# Patient Record
Sex: MALE | Race: BLACK OR AFRICAN AMERICAN | NOT HISPANIC OR LATINO | Employment: FULL TIME | URBAN - METROPOLITAN AREA
[De-identification: names, ages, dates, MRNs, and addresses within clinical notes are randomized per-mention and may not be internally consistent; named-entity substitution may affect disease eponyms.]

---

## 2018-12-18 ENCOUNTER — OFFICE VISIT (OUTPATIENT)
Dept: INTERNAL MEDICINE | Facility: CLINIC | Age: 43
End: 2018-12-18
Payer: COMMERCIAL

## 2018-12-18 VITALS
DIASTOLIC BLOOD PRESSURE: 76 MMHG | WEIGHT: 315 LBS | HEART RATE: 74 BPM | SYSTOLIC BLOOD PRESSURE: 130 MMHG | HEIGHT: 73 IN | BODY MASS INDEX: 41.75 KG/M2

## 2018-12-18 DIAGNOSIS — Z00.00 ROUTINE GENERAL MEDICAL EXAMINATION AT A HEALTH CARE FACILITY: Primary | ICD-10-CM

## 2018-12-18 DIAGNOSIS — N52.9 IMPOTENCE: ICD-10-CM

## 2018-12-18 DIAGNOSIS — E66.01 MORBID OBESITY: ICD-10-CM

## 2018-12-18 PROCEDURE — 99386 PREV VISIT NEW AGE 40-64: CPT | Mod: S$GLB,,, | Performed by: INTERNAL MEDICINE

## 2018-12-18 PROCEDURE — 99999 PR PBB SHADOW E&M-NEW PATIENT-LVL III: CPT | Mod: PBBFAC,,, | Performed by: INTERNAL MEDICINE

## 2018-12-18 RX ORDER — SILDENAFIL 100 MG/1
100 TABLET, FILM COATED ORAL DAILY PRN
Qty: 12 TABLET | Refills: 6 | Status: SHIPPED | OUTPATIENT
Start: 2018-12-18 | End: 2020-09-29 | Stop reason: SDUPTHER

## 2018-12-18 NOTE — PROGRESS NOTES
Subjective:       Patient ID: Roshan Zaidi is a 43 y.o. male.    Chief Complaint: Annual Exam    HPI  Pt establishing care.  Interested in a gastric sleeve due to his inability to lose weight.  Also c/o impotence.  Works as a .  Review of Systems   All other systems reviewed and are negative.      Objective:      Physical Exam   Constitutional: He appears well-developed. No distress.   obese   HENT:   Head: Normocephalic.   Eyes: EOM are normal. No scleral icterus.   Neck: Normal range of motion. No tracheal deviation present.   Cardiovascular: Normal rate, regular rhythm, normal heart sounds and intact distal pulses.   Pulmonary/Chest: Effort normal and breath sounds normal. No respiratory distress.   Abdominal: Soft. Bowel sounds are normal. He exhibits no distension. There is no tenderness.   Musculoskeletal: Normal range of motion. He exhibits no edema.   Neurological: He is alert.   Skin: Skin is warm and dry. No rash noted. He is not diaphoretic. No erythema.   Psychiatric: He has a normal mood and affect. His behavior is normal.   Vitals reviewed.      Assessment:       1. Routine general medical examination at a health care facility    2. Morbid obesity    3. Impotence        Plan:       Rosahn was seen today for annual exam.    Diagnoses and all orders for this visit:    Routine general medical examination at a health care facility  -     CBC auto differential; Future  -     Comprehensive metabolic panel; Future  -     Hemoglobin A1c; Future  -     Lipid panel; Future    Morbid obesity  -     Ambulatory Referral to Bariatric Medicine    Impotence  -     Testosterone Panel; Future  -     sildenafil (VIAGRA) 100 MG tablet; Take 1 tablet (100 mg total) by mouth daily as needed for Erectile Dysfunction.      Follow-up if symptoms worsen or fail to improve.

## 2018-12-19 ENCOUNTER — LAB VISIT (OUTPATIENT)
Dept: LAB | Facility: HOSPITAL | Age: 43
End: 2018-12-19
Attending: INTERNAL MEDICINE
Payer: COMMERCIAL

## 2018-12-19 DIAGNOSIS — Z00.00 ROUTINE GENERAL MEDICAL EXAMINATION AT A HEALTH CARE FACILITY: ICD-10-CM

## 2018-12-19 DIAGNOSIS — N52.9 IMPOTENCE: ICD-10-CM

## 2018-12-19 LAB
ALBUMIN SERPL BCP-MCNC: 4.4 G/DL
ALP SERPL-CCNC: 50 U/L
ALT SERPL W/O P-5'-P-CCNC: 50 U/L
ANION GAP SERPL CALC-SCNC: 9 MMOL/L
AST SERPL-CCNC: 39 U/L
BASOPHILS # BLD AUTO: 0.02 K/UL
BASOPHILS NFR BLD: 0.4 %
BILIRUB SERPL-MCNC: 0.6 MG/DL
BUN SERPL-MCNC: 12 MG/DL
CALCIUM SERPL-MCNC: 9.4 MG/DL
CHLORIDE SERPL-SCNC: 101 MMOL/L
CHOLEST SERPL-MCNC: 193 MG/DL
CHOLEST/HDLC SERPL: 5.2 {RATIO}
CO2 SERPL-SCNC: 29 MMOL/L
CREAT SERPL-MCNC: 0.83 MG/DL
DIFFERENTIAL METHOD: ABNORMAL
EOSINOPHIL # BLD AUTO: 0.2 K/UL
EOSINOPHIL NFR BLD: 2.7 %
ERYTHROCYTE [DISTWIDTH] IN BLOOD BY AUTOMATED COUNT: 13.4 %
EST. GFR  (AFRICAN AMERICAN): >60 ML/MIN/1.73 M^2
EST. GFR  (NON AFRICAN AMERICAN): >60 ML/MIN/1.73 M^2
ESTIMATED AVG GLUCOSE: 114 MG/DL
GLUCOSE SERPL-MCNC: 92 MG/DL
HBA1C MFR BLD HPLC: 5.6 %
HCT VFR BLD AUTO: 42.6 %
HDLC SERPL-MCNC: 37 MG/DL
HDLC SERPL: 19.2 %
HGB BLD-MCNC: 14 G/DL
LDLC SERPL CALC-MCNC: 143.6 MG/DL
LYMPHOCYTES # BLD AUTO: 1.8 K/UL
LYMPHOCYTES NFR BLD: 32.4 %
MCH RBC QN AUTO: 28.5 PG
MCHC RBC AUTO-ENTMCNC: 32.9 G/DL
MCV RBC AUTO: 87 FL
MONOCYTES # BLD AUTO: 0.5 K/UL
MONOCYTES NFR BLD: 9.6 %
NEUTROPHILS # BLD AUTO: 3.1 K/UL
NEUTROPHILS NFR BLD: 54.4 %
NONHDLC SERPL-MCNC: 156 MG/DL
PLATELET # BLD AUTO: 279 K/UL
PMV BLD AUTO: 8.9 FL
POTASSIUM SERPL-SCNC: 4.2 MMOL/L
PROT SERPL-MCNC: 8.7 G/DL
RBC # BLD AUTO: 4.92 M/UL
SODIUM SERPL-SCNC: 139 MMOL/L
TRIGL SERPL-MCNC: 62 MG/DL
WBC # BLD AUTO: 5.62 K/UL

## 2018-12-19 PROCEDURE — 36415 COLL VENOUS BLD VENIPUNCTURE: CPT | Mod: PO

## 2018-12-19 PROCEDURE — 80061 LIPID PANEL: CPT

## 2018-12-19 PROCEDURE — 80053 COMPREHEN METABOLIC PANEL: CPT | Mod: PO

## 2018-12-19 PROCEDURE — 83036 HEMOGLOBIN GLYCOSYLATED A1C: CPT

## 2018-12-19 PROCEDURE — 82040 ASSAY OF SERUM ALBUMIN: CPT | Mod: PO

## 2018-12-19 PROCEDURE — 85025 COMPLETE CBC W/AUTO DIFF WBC: CPT | Mod: PO

## 2018-12-19 PROCEDURE — 84270 ASSAY OF SEX HORMONE GLOBUL: CPT | Mod: PO

## 2018-12-24 LAB
ALBUMIN SERPL-MCNC: 4.3 G/DL (ref 3.6–5.1)
SHBG SERPL-SCNC: 44 NMOL/L (ref 10–50)
TESTOST FREE SERPL-MCNC: 38.7 PG/ML (ref 46–224)
TESTOST SERPL-MCNC: 377 NG/DL (ref 250–1100)
TESTOSTERONE.FREE+WB SERPL-MCNC: 76.3 NG/DL (ref 110–575)

## 2019-01-11 ENCOUNTER — TELEPHONE (OUTPATIENT)
Dept: INTERNAL MEDICINE | Facility: CLINIC | Age: 44
End: 2019-01-11

## 2019-01-11 NOTE — TELEPHONE ENCOUNTER
----- Message from Jayme Tsang sent at 1/11/2019 12:06 PM CST -----  Contact: self/[phone]  Patient called to speak with the nurse about his blood work results.  He is still waiting on a call back regarding this.    Please call and advise.

## 2019-11-15 ENCOUNTER — OFFICE VISIT (OUTPATIENT)
Dept: INTERNAL MEDICINE | Facility: CLINIC | Age: 44
End: 2019-11-15
Payer: COMMERCIAL

## 2019-11-15 ENCOUNTER — LAB VISIT (OUTPATIENT)
Dept: LAB | Facility: HOSPITAL | Age: 44
End: 2019-11-15
Attending: INTERNAL MEDICINE
Payer: COMMERCIAL

## 2019-11-15 VITALS
HEIGHT: 72 IN | OXYGEN SATURATION: 96 % | WEIGHT: 315 LBS | DIASTOLIC BLOOD PRESSURE: 74 MMHG | BODY MASS INDEX: 42.66 KG/M2 | SYSTOLIC BLOOD PRESSURE: 120 MMHG | HEART RATE: 79 BPM

## 2019-11-15 DIAGNOSIS — Z00.00 ROUTINE GENERAL MEDICAL EXAMINATION AT A HEALTH CARE FACILITY: Primary | ICD-10-CM

## 2019-11-15 DIAGNOSIS — E66.01 MORBID OBESITY: ICD-10-CM

## 2019-11-15 PROCEDURE — 83036 HEMOGLOBIN GLYCOSYLATED A1C: CPT

## 2019-11-15 PROCEDURE — 99396 PR PREVENTIVE VISIT,EST,40-64: ICD-10-PCS | Mod: 25,S$GLB,, | Performed by: INTERNAL MEDICINE

## 2019-11-15 PROCEDURE — 36415 COLL VENOUS BLD VENIPUNCTURE: CPT | Mod: PO

## 2019-11-15 PROCEDURE — 90471 FLU VACCINE (QUAD) GREATER THAN OR EQUAL TO 3YO PRESERVATIVE FREE IM: ICD-10-PCS | Mod: S$GLB,,, | Performed by: INTERNAL MEDICINE

## 2019-11-15 PROCEDURE — 99999 PR PBB SHADOW E&M-EST. PATIENT-LVL III: CPT | Mod: PBBFAC,,, | Performed by: INTERNAL MEDICINE

## 2019-11-15 PROCEDURE — 90472 IMMUNIZATION ADMIN EACH ADD: CPT | Mod: S$GLB,,, | Performed by: INTERNAL MEDICINE

## 2019-11-15 PROCEDURE — 90686 IIV4 VACC NO PRSV 0.5 ML IM: CPT | Mod: S$GLB,,, | Performed by: INTERNAL MEDICINE

## 2019-11-15 PROCEDURE — 99396 PREV VISIT EST AGE 40-64: CPT | Mod: 25,S$GLB,, | Performed by: INTERNAL MEDICINE

## 2019-11-15 PROCEDURE — 90471 IMMUNIZATION ADMIN: CPT | Mod: S$GLB,,, | Performed by: INTERNAL MEDICINE

## 2019-11-15 PROCEDURE — 90472 TDAP VACCINE GREATER THAN OR EQUAL TO 7YO IM: ICD-10-PCS | Mod: S$GLB,,, | Performed by: INTERNAL MEDICINE

## 2019-11-15 PROCEDURE — 99999 PR PBB SHADOW E&M-EST. PATIENT-LVL III: ICD-10-PCS | Mod: PBBFAC,,, | Performed by: INTERNAL MEDICINE

## 2019-11-15 PROCEDURE — 90715 TDAP VACCINE 7 YRS/> IM: CPT | Mod: S$GLB,,, | Performed by: INTERNAL MEDICINE

## 2019-11-15 PROCEDURE — 90686 FLU VACCINE (QUAD) GREATER THAN OR EQUAL TO 3YO PRESERVATIVE FREE IM: ICD-10-PCS | Mod: S$GLB,,, | Performed by: INTERNAL MEDICINE

## 2019-11-15 PROCEDURE — 90715 TDAP VACCINE GREATER THAN OR EQUAL TO 7YO IM: ICD-10-PCS | Mod: S$GLB,,, | Performed by: INTERNAL MEDICINE

## 2019-11-15 NOTE — PROGRESS NOTES
Subjective:       Patient ID: Roshan Zaidi is a 44 y.o. male.    Chief Complaint: Annual Exam    HPI  Wellness check.  Chart reviewed.  C/O inability to lose weight despite dieting.  Just passed a sleep study.  Denies hypersomnolence.  No CP, has mild TEJEDA.  Has some mild hip and knee pain.  Review of Systems   All other systems reviewed and are negative.      Objective:      Physical Exam   Constitutional: He appears well-developed. No distress.   obese   HENT:   Head: Normocephalic.   Eyes: EOM are normal. No scleral icterus.   Neck: Normal range of motion. No tracheal deviation present.   Cardiovascular: Normal rate, regular rhythm, normal heart sounds and intact distal pulses.   Pulmonary/Chest: Effort normal and breath sounds normal. No respiratory distress.   Abdominal: Soft. Bowel sounds are normal. He exhibits no distension. There is no tenderness.   Musculoskeletal: Normal range of motion. He exhibits no edema.   Neurological: He is alert.   Skin: Skin is warm and dry. No rash noted. He is not diaphoretic. No erythema.   Psychiatric: He has a normal mood and affect. His behavior is normal.   Vitals reviewed.      Assessment:       1. Routine general medical examination at a health care facility    2. Morbid obesity        Plan:       Roshan was seen today for annual exam.    Diagnoses and all orders for this visit:    Routine general medical examination at a health care facility  -     Influenza - Quadrivalent (PF)  -     Tdap Vaccine    Morbid obesity  -     Hemoglobin A1c; Future  -     Ambulatory Referral to Bariatric Medicine      Follow up if symptoms worsen or fail to improve.

## 2019-11-16 LAB
ESTIMATED AVG GLUCOSE: 114 MG/DL (ref 68–131)
HBA1C MFR BLD HPLC: 5.6 % (ref 4–5.6)

## 2019-11-18 ENCOUNTER — TELEPHONE (OUTPATIENT)
Dept: BARIATRICS | Facility: CLINIC | Age: 44
End: 2019-11-18

## 2019-11-18 ENCOUNTER — TELEPHONE (OUTPATIENT)
Dept: INTERNAL MEDICINE | Facility: CLINIC | Age: 44
End: 2019-11-18

## 2019-11-18 NOTE — TELEPHONE ENCOUNTER
----- Message from Bella Fitzgerald sent at 11/18/2019 10:53 AM CST -----  Contact: pt   Please call pt at [phone]    Patient has questions about why the bariatric surgery is not covered by [employer]?    Thank you

## 2019-11-18 NOTE — TELEPHONE ENCOUNTER
Called pt in regards to his message. Pt will call back in January after switching his insurance and making sure they cover bariatric surgery.

## 2020-02-07 ENCOUNTER — OCCUPATIONAL HEALTH (OUTPATIENT)
Dept: URGENT CARE | Facility: CLINIC | Age: 45
End: 2020-02-07

## 2020-02-07 DIAGNOSIS — Z02.89 ENCOUNTER FOR EXAMINATION REQUIRED BY DEPARTMENT OF TRANSPORTATION (DOT): ICD-10-CM

## 2020-02-07 DIAGNOSIS — Z02.1 PRE-EMPLOYMENT EXAMINATION: Primary | ICD-10-CM

## 2020-02-07 PROCEDURE — 99080 OSHA QUESTIONNAIRE: ICD-10-PCS | Mod: S$GLB,,, | Performed by: PHYSICIAN ASSISTANT

## 2020-02-07 PROCEDURE — 99499 UNLISTED E&M SERVICE: CPT | Mod: S$GLB,,, | Performed by: PHYSICIAN ASSISTANT

## 2020-02-07 PROCEDURE — 99080 SPECIAL REPORTS OR FORMS: CPT | Mod: S$GLB,,, | Performed by: PHYSICIAN ASSISTANT

## 2020-02-07 PROCEDURE — 94010 PULMONARY FUNCTION SCREENING (OCC MED PHYSICALS): ICD-10-PCS | Mod: S$GLB,,, | Performed by: PHYSICIAN ASSISTANT

## 2020-02-07 PROCEDURE — 99002 MASK FIT-QUALITATIVE: ICD-10-PCS | Mod: S$GLB,,, | Performed by: PHYSICIAN ASSISTANT

## 2020-02-07 PROCEDURE — 99002 DEVICE HANDLING PHYS/QHP: CPT | Mod: S$GLB,,, | Performed by: PHYSICIAN ASSISTANT

## 2020-02-07 PROCEDURE — 99499 PHYSICAL, RECERT DOT/CDL: ICD-10-PCS | Mod: S$GLB,,, | Performed by: PHYSICIAN ASSISTANT

## 2020-02-07 PROCEDURE — 94010 BREATHING CAPACITY TEST: CPT | Mod: S$GLB,,, | Performed by: PHYSICIAN ASSISTANT

## 2020-07-22 ENCOUNTER — TELEPHONE (OUTPATIENT)
Dept: BARIATRICS | Facility: CLINIC | Age: 45
End: 2020-07-22

## 2020-07-22 NOTE — TELEPHONE ENCOUNTER
----- Message from Yolanda Reyes sent at 7/22/2020  2:01 PM CDT -----  Contact: Patient Called [phone]  Wanting to see about Speak with .  Interested in Bariatric Sleeve.  Has a couple of questions in regards to payment.

## 2020-07-23 NOTE — TELEPHONE ENCOUNTER
Phoned patient and scheduled consult appointment. Patient with further finance questions. Gave patient 's number and advised to call tomorrow. Patient verbalized understanding.

## 2020-07-29 ENCOUNTER — TELEPHONE (OUTPATIENT)
Dept: BARIATRICS | Facility: CLINIC | Age: 45
End: 2020-07-29

## 2020-07-29 NOTE — TELEPHONE ENCOUNTER
Sent pt the link to do his [facility] pt portal. Patient stated he will have it done before his appt.

## 2020-07-30 ENCOUNTER — CLINICAL SUPPORT (OUTPATIENT)
Dept: BARIATRICS | Facility: CLINIC | Age: 45
End: 2020-07-30

## 2020-07-30 ENCOUNTER — OFFICE VISIT (OUTPATIENT)
Dept: BARIATRICS | Facility: CLINIC | Age: 45
End: 2020-07-30
Payer: COMMERCIAL

## 2020-07-30 ENCOUNTER — HOSPITAL ENCOUNTER (OUTPATIENT)
Dept: RADIOLOGY | Facility: HOSPITAL | Age: 45
Discharge: HOME | End: 2020-07-30
Attending: PHYSICIAN ASSISTANT
Payer: COMMERCIAL

## 2020-07-30 VITALS — BODY MASS INDEX: 42.66 KG/M2 | WEIGHT: 315 LBS | HEIGHT: 72 IN

## 2020-07-30 DIAGNOSIS — E66.01 MORBID OBESITY WITH BMI OF 60.0-69.9, ADULT: ICD-10-CM

## 2020-07-30 DIAGNOSIS — E66.01 MORBID OBESITY WITH BMI OF 60.0-69.9, ADULT: Primary | ICD-10-CM

## 2020-07-30 PROCEDURE — 71046 X-RAY EXAM CHEST 2 VIEWS: CPT | Mod: TC,FY,PO

## 2020-07-30 PROCEDURE — 99205 PR OFFICE/OUTPT VISIT, NEW, LEVL V, 60-74 MIN: ICD-10-PCS | Mod: 95,,, | Performed by: PHYSICIAN ASSISTANT

## 2020-07-30 PROCEDURE — 99499 UNLISTED E&M SERVICE: CPT | Mod: 95,,, | Performed by: DIETITIAN, REGISTERED

## 2020-07-30 PROCEDURE — 99205 OFFICE O/P NEW HI 60 MIN: CPT | Mod: 95,,, | Performed by: PHYSICIAN ASSISTANT

## 2020-07-30 PROCEDURE — 99499 NO LOS: ICD-10-PCS | Mod: 95,,, | Performed by: DIETITIAN, REGISTERED

## 2020-07-30 NOTE — LETTER
July 30, 2020      Jose Holm MD  [address] [facility] [address]  [address]  [place] [address]           [facility] - Bariatric Surgery  [address]  [place] [address]  Phone: [phone]  Fax: [phone]          Patient: Roshan Zaidi   MR Number: [id number]   YOB: 1975   Date of Visit: 7/30/2020       Dear Dr. Jose Holm:    Thank you for referring Roshan Zaidi to me for evaluation. Attached you will find relevant portions of my assessment and plan of care.    If you have questions, please do not hesitate to call me. I look forward to following Roshan Zaidi along with you.    Sincerely,    Nico Tobar PA-C    Enclosure  CC:  No Recipients    If you would like to receive this communication electronically, please contact [email or web address] or [phone] to request more information on [employer] Link access.    For providers and/or their staff who would like to refer a patient to [facility], please contact us through our one-stop-shop provider referral line, [facility], at [phone].    If you feel you have received this communication in error or would no longer like to receive these types of communications, please e-mail [email or web address]

## 2020-07-30 NOTE — PROGRESS NOTES
The patient location is: in a parked car ([place])  The chief complaint leading to consultation is: morbid obesity    Visit type: audiovisual    Face to Face time with patient: 60 min  60 minutes of total time spent on the encounter, which includes face to face time and non-face to face time preparing to see the patient (eg, review of tests), Obtaining and/or reviewing separately obtained history, Documenting clinical information in the electronic or other health record, Independently interpreting results (not separately reported) and communicating results to the patient/family/caregiver, or Care coordination (not separately reported).         Each patient to whom he or she provides medical services by telemedicine is:  (1) informed of the relationship between the physician and patient and the respective role of any other health care provider with respect to management of the patient; and (2) notified that he or she may decline to receive medical services by telemedicine and may withdraw from such care at any time.    NUTRITIONAL CONSULT    Referring Physician: Papa Arrington M.D.  Reason for MNT Referral: Initial assessment for sleeve gastrectomy work-up    PAST MEDICAL HISTORY:   45 y.o. male  Weight history includes weight has always been an issue but it was easier to maintain. 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 Presently at highest weight.     One of his greatest struggles has been his work schdule, driving trucks. He was eating late at night d/t getting off work late at night. Changed schedules with his job to an earlier shift, but he is driving out of town more, which means he was relying on fast food or take out more. Has a fridge in his truck. Started packing and bringing his own food. States he is ready to make a permanent lifestyle change for his health.    CLINICAL  DATA:  45 y.o.-year-old Black or  male.  Height: 6 ft.  Weight: 407 lbs (self-reported from 's visit in March)  IBW: 170 lbs  BMI: 55.2  The patient's goal weight (50% EBW): 289 lbs (BMI 39)  Personal goal weight: 225-250 lbs    Goal for Bariatric Surgery: to improve health, to improve quality of life, to lose weight and to prevent future medical conditions    NUTRITIONAL NEEDS: Pre op for bariatric surgery  1508-2032 Calories    Grams Protein    NUTRITION & HEALTH HISTORY:  Greatest challenge: dining out frequency, sweets, starchy CHO, portion control, snacking at night, irregular meal patterns and high-fat diet    Current diet recall:     4am wake up  6am: homemade smoothie: strawberries, banana, almond milk, protein powder  11am: turkey sandwich, bag of chips  - orange slices  Off work 4pm  5pm: chicken breast stuffed with creamed spinach and topped with shredded cheese    Current Diet: Lower carb - the past 4 months  Meal pattern: 3 meals  Protein supplements: 1-2/week. Premier shakes or homemade smoothies (protein powder, fruits, and almond milk)  Snacking: healthier options lately. Trying to avoid sweets.  Vegetables: Likes a variety. Eats almost daily.  Fruits: Likes a variety. Eats almost daily.  Beverages: water  Dining out: Reduced lately. Mostly fast food, restaurants and take-out.  Cooking at home: Daily. Weekly. Mostly baked, grilled and smothered meat, fish, starchy CHO and vegetables. Chicken breasts stuffed with creamed spinach and topped with cheese. Baked chicken, brown rice, green beans. Salads. Staying away from pasta and potatoes.    Exercise:  Past exercise: Adequate. At the gym 2 hrs cardio and weights.    Current exercise: None. Has gym membership and has exercise equipment at home (treadmill, elliptical, weight bench).   Restrictions to exercise: time management and lack of motivation    Vitamins / Minerals / Herbs:   MV, apple cider vinegar, potassium, elderberry,  flax seed, Vit D3, tumeric,     Ran out but plans to buy soon: Super B complex, B12 sublingual, vit E      Labs:   none available at this time    Food Allergies:   Shrimp - SOB, hives    Social:  Drives trucks.  Lives with wife and son.  Grocery shopping and food prep done by the pt.  Patient believes the household will be supportive after surgery.  Alcohol: None.  Smoking: None.    ASSESSMENT:  · Patient reports attempts at weight loss, only to regain lost weight.  · Patient demonstrated knowledge of healthy eating behaviors and exercise patterns; admits to not eating healthy and not exercising at this point.  · Patient demonstrates willingness to change lifestyle and make behavior modifications as evidenced by dietary changes, including protein drinks, increased fruits, increased vegetables, reduced dining out, more food preparation at [employer], healthier cooking at home, bringing snacks to work, healthier snacking at work and healthier snacking at home.        Barriers to Education: none    Stage of change: action    NUTRITION DIAGNOSIS:     Morbid Obesity related to Excessive carbohydrate intake and Physical inactivity as evidence by BMI.    BARIATRIC DIET DISCUSSION/PLAN:  Discussed diet after surgery and related to patient's food record.  Reviewed nutrition guidelines for before and after surgery.  Answered all questions.  Continue to review Bariatric Nutrition Guidebook at home and call with any questions.  Work on Bariatric Nutrition Checklist.  5-6 meals per day.  Start shopping for bariatric vitamins & minerals.   Increase exercise.    RECOMMENDATIONS:  Patient is a good candidate for bariatric surgery - Sleeve.    Patient verbalized understanding.    Expect good  compliance after surgery at this time.    Communicated nutrition plan with bariatric team.    SESSION TIME:  60 minutes

## 2020-07-30 NOTE — PROGRESS NOTES
BARIATRIC NEW PATIENT EVALUATION  The patient location is: home  The chief complaint leading to consultation is: consult    Visit type: audiovisual    Face to Face time with patient: 20 minute  of total time spent on the encounter, which includes face to face time and non-face to face time preparing to see the patient (eg, review of tests), Obtaining and/or reviewing separately obtained history, Documenting clinical information in the electronic or other health record, Independently interpreting results (not separately reported) and communicating results to the patient/family/caregiver, or Care coordination (not separately reported).         Each patient to whom he or she provides medical services by telemedicine is:  (1) informed of the relationship between the physician and patient and the respective role of any other health care provider with respect to management of the patient; and (2) notified that he or she may decline to receive medical services by telemedicine and may withdraw from such care at any time.    Notes:   CHIEF COMPLAINT:   Morbid Obesity Body mass index is 55.2 kg/m². and inability to lose and maintain weight loss.    HISTORY OF PRESENT ILLNESS:  Roshan Zaidi  is a 45 y.o. male presenting for morbid obesity Body mass index is 55.2 kg/m². and inability to lose and maintain weight loss. He states that weight has always been an issue but it was easier to maintain. 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 Presently at highest weight    Pt goal:225-250 lbs  IBW: 180lbs     PAST MEDICAL HISTORY:  History reviewed. No pertinent past medical history.      PAST SURGICAL HISTORY:  Past Surgical History:   Procedure Laterality Date    EYE SURGERY      TIBIA FRACTURE SURGERY  Bilateral        FAMILY HISTORY:  Family History   Problem Relation Age of Onset    Diabetes Mother     No Known Problems Father     No Known Problems Son     No Known Problems Brother     No Known Problems Brother        SOCIAL HISTORY:  Social History     Socioeconomic History    Marital status:      Spouse name: Not on file    Number of children: Not on file    Years of education: Not on file    Highest education level: Not on file   Occupational History    Not on file   Social Needs    Financial resource strain: Not on file    Food insecurity     Worry: Not on file     Inability: Not on file    Transportation needs     Medical: Not on file     Non-medical: Not on file   Tobacco Use    Smoking status: Never Smoker    Smokeless tobacco: Never Used   Substance and Sexual Activity    Alcohol use: No     Frequency: Never    Drug use: No    Sexual activity: Yes     Partners: Female   Lifestyle    Physical activity     Days per week: Not on file     Minutes per session: Not on file    Stress: Not on file   Relationships    Social connections     Talks on phone: Not on file     Gets together: Not on file     Attends [employer] service: Not on file     Active member of club or organization: Not on file     Attends meetings of clubs or organizations: Not on file     Relationship status: Not on file   Other Topics Concern    Not on file   Social History Narrative    Not on file       MEDICATIONS:  Current Outpatient Medications   Medication Sig Dispense Refill    sildenafil (VIAGRA) 100 MG tablet Take 1 tablet (100 mg total) by mouth daily as needed for Erectile Dysfunction. 12 tablet 6     No current facility-administered medications for this visit.        ALLERGIES:  Review of patient's allergies indicates:   Allergen Reactions    Shrimp Hives and Shortness Of Breath       ROS:  Review of Systems   Constitutional: Negative for chills and fever.   HENT: Negative for sore throat and tinnitus.     Eyes: Negative for blurred vision and double vision.   Respiratory: Negative for cough and shortness of breath.    Cardiovascular: Negative for chest pain, palpitations and leg swelling.   Gastrointestinal: Negative for abdominal pain, constipation, diarrhea, heartburn, nausea and vomiting.   Genitourinary: Negative for dysuria and hematuria.   Musculoskeletal: Positive for back pain. Negative for myalgias and neck pain.   Skin: Negative for rash.   Neurological: Negative for dizziness, tingling and headaches.   Psychiatric/Behavioral: Negative for depression and suicidal ideas. The patient is not nervous/anxious.        PE:  Vitals:    07/30/20 1057   Weight: (!) 184.6 kg (407 lb)   Height: 6' (1.829 m)       Physical Exam  Constitutional:       Appearance: He is obese.   HENT:      Head: Normocephalic and atraumatic.   Eyes:      Extraocular Movements: Extraocular movements intact.      Conjunctiva/sclera: Conjunctivae normal.   Neck:      Musculoskeletal: Normal range of motion.   Neurological:      General: No focal deficit present.      Mental Status: He is alert and oriented to person, place, and time.   Psychiatric:         Mood and Affect: Mood normal.         Thought Content: Thought content normal.          DIAGNOSIS:  1. Morbid Obesity with Body mass index is 55.2 kg/m². and inability to lose and maintain weight loss.  2. Co-morbidities: none  PLAN:  The patient is good candidate for Bariatric Surgery. he is interested in sleeve gastrectomy with . The surgery and post-op care were discussed in detail with the patient. All questions were answered.    he understands that bariatric surgery is a tool to aid in weight loss and that he needs to be committed to the diet and exercise post-operatively for successful weight loss.  Discussed expected weight loss outcomes after surgery which is 50% of the excess weight on his frame.  Goal weight is 293.5 #s.  Discussed with patient that bariatric  surgery is not the easy way out and that it will take plenty of dedication on the patient's part to be successful. Also discussed the possibility of weight regain if the patient strays from the diet guidelines or exercise requirements. Patient verbalized understanding and wishes to proceed with the work-up.    ORDERS:  1. Stress Test, Chest X-Ray and EKG  2. Psychological Consult and Bariatric Dietician Consult  3. Bariatric Labs: BMP, CBC, Folate Serum, H. pylori, HgA1C, Hepatic Panel/LFT, Iron & TIBC, Lipid Profile, Magnesium, Phosphate, T3, T4, TSH, Free T4, Vitamin B12, and Vitamin B1.  4. No NSAIDS after surgery due to increased risk of stomach ulcers. Talk to your prescribing provider about alternative options for your pain.      Primary Physician: Jose Holm MD  RTC: As scheduled.    60 minute visit, over 50% of time spent counseling patient face to face on surgical options, risks, benefits, expected diet, recommended exercise regimen, and expected weight loss.

## 2020-07-30 NOTE — PATIENT INSTRUCTIONS
Prior to surgery you will need to complete:  - Dietitian consult and follow up appointments as needed  - Labs  - Chest X-ray  - EKG  - Psychological evaluation, Please call psychiatry [phone] to schedule  - stress test     In preparation for bariatric surgery, please complete the following:   · Discuss your current medications with your primary care provider, remember your medications will need to be crushed, chewable, or in liquid form for the first 3-6 months after a gastric bypass or sleeve.  For a gastric band, your medications will need to be crushed indefinitely.    · If you take a blood thinner such as: Coumadin (warfarin), Pradaxa (dabigatran), or Plavix (clopidogrel), you will need to speak with your prescribing provider on how or if this medication can be stopped before surgery.   · If you take a medication for depression or anxiety, you will need to begin crushing or opening the capsule 1-3 months prior to surgery.  Remember to discuss this with the psychologist or psychiatrist that you see.   · If you take medication for arthritis on a daily basis that is considered a non-steroidal anti-inflammatory (NSAID), please discuss with your prescribing physician an alternative medication.  After having gastric bypass or gastric sleeve, this group of medications is not appropriate to take due to increased risk of bleeding stomach ulcers.      DEFINITIONS  Appointments: Pre-scheduled meetings or consultations with any physician, advanced practice provider, dietitian, or psychologist, and labs, imaging studies, sleep studies, etc.   Late cancellation: Cancelling an appointment 24-48 hours prior to scheduled time.  No-Show appointment:  is when    You do NOT arrive to your appointment at the time its scheduled.   You call to cancel or cancel via [facility] less than 24 hours in advance of your scheduled appointment   You show up 15 minutes AFTER your scheduled appointment time without any notification of  being late.     POLICY  1. You are allowed up to 3 cancellations for appointments.    After 3 cancellations your case will be placed on hold for 2 months and after that time you can resume the program.   2. You are allowed only 1 no-show for an appointment.    You will be re-scheduled one time and if there is a 2nd no-show at any point, your case will be placed on hold for 3 months.  After 3 months you can resume the program.     3. Upon resuming the program after being placed on hold for either above mentioned reasons, if you have a late cancel or no show for any appointment, the bariatric team will review if youre an appropriate candidate for surgery at the monthly meeting.

## 2020-08-03 ENCOUNTER — DOCUMENTATION ONLY (OUTPATIENT)
Dept: BARIATRICS | Facility: CLINIC | Age: 45
End: 2020-08-03

## 2020-08-04 ENCOUNTER — TELEPHONE (OUTPATIENT)
Dept: BARIATRICS | Facility: CLINIC | Age: 45
End: 2020-08-04

## 2020-08-04 NOTE — TELEPHONE ENCOUNTER
Phoned patient per request from . Patient without insurance until September 1 and would like to know the price of his testing. Advised patient that he can wait until his insurance kicks in and we can coordinate some testing with PCP. Patient states he would like to know the price before deciding to wait. Patient given number for  for prices of testing. List of tests sent via portal message.

## 2020-08-10 ENCOUNTER — HOSPITAL ENCOUNTER (OUTPATIENT)
Dept: CARDIOLOGY | Facility: HOSPITAL | Age: 45
Discharge: HOME | End: 2020-08-10
Attending: PHYSICIAN ASSISTANT
Payer: COMMERCIAL

## 2020-08-10 ENCOUNTER — CLINICAL SUPPORT (OUTPATIENT)
Dept: LAB | Facility: HOSPITAL | Age: 45
End: 2020-08-10
Attending: PHYSICIAN ASSISTANT
Payer: COMMERCIAL

## 2020-08-10 VITALS — WEIGHT: 315 LBS | HEIGHT: 72 IN | BODY MASS INDEX: 42.66 KG/M2

## 2020-08-10 DIAGNOSIS — E66.01 MORBID OBESITY WITH BMI OF 60.0-69.9, ADULT: ICD-10-CM

## 2020-08-10 PROCEDURE — 93352 ADMIN ECG CONTRAST AGENT: CPT | Mod: ,,, | Performed by: INTERNAL MEDICINE

## 2020-08-10 PROCEDURE — 93352 STRESS ECHO (CUPID ONLY): ICD-10-PCS | Mod: ,,, | Performed by: INTERNAL MEDICINE

## 2020-08-10 PROCEDURE — 93351 STRESS ECHO (CUPID ONLY): ICD-10-PCS | Mod: 26,,, | Performed by: INTERNAL MEDICINE

## 2020-08-10 PROCEDURE — 63600175 PHARM REV CODE 636 W HCPCS: Performed by: PHYSICIAN ASSISTANT

## 2020-08-10 PROCEDURE — 93005 ELECTROCARDIOGRAM TRACING: CPT

## 2020-08-10 PROCEDURE — 93351 STRESS TTE COMPLETE: CPT

## 2020-08-10 PROCEDURE — 93351 STRESS TTE COMPLETE: CPT | Mod: 26,,, | Performed by: INTERNAL MEDICINE

## 2020-08-10 PROCEDURE — 93010 EKG 12-LEAD: ICD-10-PCS | Mod: ,,, | Performed by: INTERNAL MEDICINE

## 2020-08-10 PROCEDURE — 63600175 PHARM REV CODE 636 W HCPCS: Performed by: INTERNAL MEDICINE

## 2020-08-10 PROCEDURE — 93010 ELECTROCARDIOGRAM REPORT: CPT | Mod: ,,, | Performed by: INTERNAL MEDICINE

## 2020-08-10 RX ORDER — DOBUTAMINE HYDROCHLORIDE 400 MG/100ML
10 INJECTION INTRAVENOUS CONTINUOUS
Status: DISCONTINUED | OUTPATIENT
Start: 2020-08-10 | End: 2020-12-02 | Stop reason: CLARIF

## 2020-08-10 RX ADMIN — HUMAN ALBUMIN MICROSPHERES AND PERFLUTREN 0.11 MG: 10; .22 INJECTION, SOLUTION INTRAVENOUS at 02:08

## 2020-08-10 RX ADMIN — DOBUTAMINE HYDROCHLORIDE 10 MCG/KG/MIN: 400 INJECTION INTRAVENOUS at 02:08

## 2020-08-10 NOTE — NURSING
1410    Patient on table, ready for test.  Allergies/history confirmed.  Connected to monitor EKG and NIBP  1415   SHIKHA Montalvo NP @ [facility], consent obtained, all questions answered.  1419  Test started per protocol, See EKG sheet for VS   1422   Dobutamine increased to 20mcg per protocol to increase heart rate.  Pt tolerating test well   1425    Dobutamine increased to 30 mcg per protocol to increase heart rate, pt tolerating test well.    1427    Target heart rate achieved.    1430    Testing phase complete, dobutamine off.  NS up at rapid rate for recovery phase   1450     Recovery phase complete.  Patient states she feels normal, no distress.  NS dc'd 200cc infused.               Iv d/c'd.  Pt released to Cardio.

## 2020-08-11 LAB
BSA FOR ECHO PROCEDURE: 3.06 M2
CV ECHO LV RWT: 0.45 CM
CV STRESS BASE HR: 69 BPM
DIASTOLIC BLOOD PRESSURE: 60 MMHG
ECHO LV POSTERIOR WALL: 1.2 CM (ref 0.6–1.1)
FRACTIONAL SHORTENING: 32 % (ref 28–44)
INTERVENTRICULAR SEPTUM: 1.2 CM (ref 0.6–1.1)
LEFT INTERNAL DIMENSION IN SYSTOLE: 3.6 CM (ref 2.1–4)
LEFT VENTRICLE MASS INDEX: 89 G/M2
LEFT VENTRICULAR INTERNAL DIMENSION IN DIASTOLE: 5.3 CM (ref 3.5–6)
LEFT VENTRICULAR MASS: 256.57 G
OHS CV CPX 85 PERCENT MAX PREDICTED HEART RATE MALE: 149
OHS CV CPX MAX PREDICTED HEART RATE: 175
OHS CV CPX PATIENT IS FEMALE: 0
OHS CV CPX PATIENT IS MALE: 1
OHS CV CPX PEAK DIASTOLIC BLOOD PRESSURE: 122 MMHG
OHS CV CPX PEAK HEAR RATE: 150 BPM
OHS CV CPX PEAK RATE PRESSURE PRODUCT: NORMAL
OHS CV CPX PEAK SYSTOLIC BLOOD PRESSURE: 181 MMHG
OHS CV CPX PERCENT MAX PREDICTED HEART RATE ACHIEVED: 86
OHS CV CPX RATE PRESSURE PRODUCT PRESENTING: 6900
SYSTOLIC BLOOD PRESSURE: 100 MMHG

## 2020-09-23 ENCOUNTER — TELEPHONE (OUTPATIENT)
Dept: INTERNAL MEDICINE | Facility: CLINIC | Age: 45
End: 2020-09-23

## 2020-09-23 NOTE — TELEPHONE ENCOUNTER
----- Message from Catracho Simpson sent at 9/23/2020 12:06 PM CDT -----  Type:  Needs Medical Advice    Who Called: [place]  Symptoms (please be specific): pt is calling because he switched insurance and wants to know if Dr. Holm takes his new insurance  How long has patient had these symptoms:  unknown  Pharmacy name and phone #:  n/a  Would the patient rather a call back or a response via [facility]? Call back  Best Call Back Number: [phone]  Additional Information: none

## 2020-09-29 ENCOUNTER — OFFICE VISIT (OUTPATIENT)
Dept: INTERNAL MEDICINE | Facility: CLINIC | Age: 45
End: 2020-09-29
Payer: COMMERCIAL

## 2020-09-29 VITALS
SYSTOLIC BLOOD PRESSURE: 130 MMHG | HEIGHT: 72 IN | BODY MASS INDEX: 42.66 KG/M2 | HEART RATE: 78 BPM | DIASTOLIC BLOOD PRESSURE: 70 MMHG | OXYGEN SATURATION: 98 % | TEMPERATURE: 98 F | WEIGHT: 315 LBS

## 2020-09-29 DIAGNOSIS — N52.9 IMPOTENCE: ICD-10-CM

## 2020-09-29 DIAGNOSIS — Z00.00 ROUTINE GENERAL MEDICAL EXAMINATION AT A HEALTH CARE FACILITY: Primary | ICD-10-CM

## 2020-09-29 DIAGNOSIS — E66.01 MORBID OBESITY: ICD-10-CM

## 2020-09-29 PROCEDURE — 99999 PR PBB SHADOW E&M-EST. PATIENT-LVL IV: CPT | Mod: PBBFAC,,, | Performed by: INTERNAL MEDICINE

## 2020-09-29 PROCEDURE — 99999 PR PBB SHADOW E&M-EST. PATIENT-LVL IV: ICD-10-PCS | Mod: PBBFAC,,, | Performed by: INTERNAL MEDICINE

## 2020-09-29 PROCEDURE — 99396 PR PREVENTIVE VISIT,EST,40-64: ICD-10-PCS | Mod: S$GLB,,, | Performed by: INTERNAL MEDICINE

## 2020-09-29 PROCEDURE — 99396 PREV VISIT EST AGE 40-64: CPT | Mod: S$GLB,,, | Performed by: INTERNAL MEDICINE

## 2020-09-29 RX ORDER — SILDENAFIL 100 MG/1
100 TABLET, FILM COATED ORAL DAILY PRN
Qty: 12 TABLET | Refills: 6 | Status: SHIPPED | OUTPATIENT
Start: 2020-09-29 | End: 2021-09-29

## 2020-09-29 NOTE — PROGRESS NOTES
Subjective:       Patient ID: Roshan Zaidi is a 45 y.o. male.    Chief Complaint: Pre-op Exam (pt wants to be referred to psychology)    HPI  Wellness check.  Chart reviewed.  Weight up 16 lbs/ 1 year.  Sched for bariatric surgery, needs psych eval.  No CP, SOB.  No anxiety or depression.  Review of Systems   All other systems reviewed and are negative.      Objective:      Physical Exam  Vitals signs reviewed.   Constitutional:       General: He is not in acute distress.     Appearance: He is well-developed. He is obese. He is not diaphoretic.   HENT:      Head: Normocephalic.      Mouth/Throat:      Mouth: Mucous membranes are moist.   Eyes:      General: No scleral icterus.     Extraocular Movements: Extraocular movements intact.      Conjunctiva/sclera: Conjunctivae normal.   Neck:      Musculoskeletal: Normal range of motion.      Trachea: No tracheal deviation.   Cardiovascular:      Rate and Rhythm: Normal rate and regular rhythm.      Pulses: Normal pulses.      Heart sounds: Normal heart sounds.   Pulmonary:      Effort: Pulmonary effort is normal. No respiratory distress.      Breath sounds: Normal breath sounds.   Abdominal:      General: Bowel sounds are normal. There is no distension.      Palpations: Abdomen is soft. There is no mass.      Tenderness: There is no abdominal tenderness.   Musculoskeletal: Normal range of motion.      Right lower leg: No edema.      Left lower leg: No edema.   Skin:     General: Skin is warm and dry.      Findings: No erythema or rash.   Neurological:      General: No focal deficit present.      Mental Status: He is alert.   Psychiatric:         Mood and Affect: Mood normal.         Behavior: Behavior normal.         Assessment:       1. Routine general medical examination at a health care facility    2. Morbid obesity    3. Impotence        Plan:       Roshan was seen today for pre-op exam.    Diagnoses and all orders for this visit:    Routine general medical  examination at a health care facility    Morbid obesity  -     Ambulatory referral/consult to Psychology; Future    Impotence  -     sildenafiL (VIAGRA) 100 MG tablet; Take 1 tablet (100 mg total) by mouth daily as needed for Erectile Dysfunction.      No follow-ups on file.

## 2020-10-07 ENCOUNTER — LAB VISIT (OUTPATIENT)
Dept: LAB | Facility: HOSPITAL | Age: 45
End: 2020-10-07
Attending: PHYSICIAN ASSISTANT
Payer: COMMERCIAL

## 2020-10-07 ENCOUNTER — PATIENT MESSAGE (OUTPATIENT)
Dept: BARIATRICS | Facility: CLINIC | Age: 45
End: 2020-10-07

## 2020-10-07 DIAGNOSIS — E66.01 MORBID OBESITY WITH BMI OF 60.0-69.9, ADULT: ICD-10-CM

## 2020-10-07 LAB
25(OH)D3+25(OH)D2 SERPL-MCNC: 21 NG/ML (ref 30–96)
ALBUMIN SERPL BCP-MCNC: 4.7 G/DL (ref 3.5–5.2)
ALP SERPL-CCNC: 51 U/L (ref 38–126)
ALT SERPL W/O P-5'-P-CCNC: 77 U/L (ref 10–44)
ANION GAP SERPL CALC-SCNC: 10 MMOL/L (ref 8–16)
AST SERPL-CCNC: 58 U/L (ref 15–46)
BASOPHILS # BLD AUTO: 0.02 K/UL (ref 0–0.2)
BASOPHILS NFR BLD: 0.4 % (ref 0–1.9)
BILIRUB SERPL-MCNC: 0.5 MG/DL (ref 0.1–1)
BUN SERPL-MCNC: 13 MG/DL (ref 2–20)
CALCIUM SERPL-MCNC: 9.3 MG/DL (ref 8.7–10.5)
CHLORIDE SERPL-SCNC: 103 MMOL/L (ref 95–110)
CHOLEST SERPL-MCNC: 209 MG/DL (ref 120–199)
CHOLEST/HDLC SERPL: 5.1 {RATIO} (ref 2–5)
CO2 SERPL-SCNC: 29 MMOL/L (ref 23–29)
CREAT SERPL-MCNC: 0.92 MG/DL (ref 0.5–1.4)
DIFFERENTIAL METHOD: ABNORMAL
EOSINOPHIL # BLD AUTO: 0.1 K/UL (ref 0–0.5)
EOSINOPHIL NFR BLD: 1.3 % (ref 0–8)
ERYTHROCYTE [DISTWIDTH] IN BLOOD BY AUTOMATED COUNT: 12.9 % (ref 11.5–14.5)
EST. GFR  (AFRICAN AMERICAN): >60 ML/MIN/1.73 M^2
EST. GFR  (NON AFRICAN AMERICAN): >60 ML/MIN/1.73 M^2
ESTIMATED AVG GLUCOSE: 131 MG/DL (ref 68–131)
FOLATE SERPL-MCNC: 14.1 NG/ML (ref 4–24)
GLUCOSE SERPL-MCNC: 122 MG/DL (ref 70–110)
HBA1C MFR BLD HPLC: 6.2 % (ref 4–5.6)
HCT VFR BLD AUTO: 44.8 % (ref 40–54)
HDLC SERPL-MCNC: 41 MG/DL (ref 40–75)
HDLC SERPL: 19.6 % (ref 20–50)
HGB BLD-MCNC: 14.3 G/DL (ref 14–18)
IMM GRANULOCYTES # BLD AUTO: 0.03 K/UL (ref 0–0.04)
IMM GRANULOCYTES NFR BLD AUTO: 0.5 % (ref 0–0.5)
IRON SERPL-MCNC: 80 UG/DL (ref 45–160)
LDLC SERPL CALC-MCNC: 152 MG/DL (ref 63–159)
LYMPHOCYTES # BLD AUTO: 1.3 K/UL (ref 1–4.8)
LYMPHOCYTES NFR BLD: 24.1 % (ref 18–48)
MAGNESIUM SERPL-MCNC: 1.6 MG/DL (ref 1.6–2.6)
MCH RBC QN AUTO: 28.9 PG (ref 27–31)
MCHC RBC AUTO-ENTMCNC: 31.9 G/DL (ref 32–36)
MCV RBC AUTO: 91 FL (ref 82–98)
MONOCYTES # BLD AUTO: 0.5 K/UL (ref 0.3–1)
MONOCYTES NFR BLD: 9.4 % (ref 4–15)
NEUTROPHILS # BLD AUTO: 3.5 K/UL (ref 1.8–7.7)
NEUTROPHILS NFR BLD: 64.3 % (ref 38–73)
NONHDLC SERPL-MCNC: 168 MG/DL
NRBC BLD-RTO: 0 /100 WBC
PHOSPHATE SERPL-MCNC: 3.2 MG/DL (ref 2.7–4.5)
PLATELET # BLD AUTO: 264 K/UL (ref 150–350)
PMV BLD AUTO: 9.2 FL (ref 9.2–12.9)
POTASSIUM SERPL-SCNC: 4.5 MMOL/L (ref 3.5–5.1)
PROT SERPL-MCNC: 8.5 G/DL (ref 6–8.4)
RBC # BLD AUTO: 4.94 M/UL (ref 4.6–6.2)
SATURATED IRON: 20 % (ref 20–50)
SODIUM SERPL-SCNC: 142 MMOL/L (ref 136–145)
T3 SERPL-MCNC: 86 NG/DL (ref 60–180)
T4 FREE SERPL-MCNC: 0.98 NG/DL (ref 0.71–1.51)
T4 SERPL-MCNC: 7.9 UG/DL (ref 4.5–11.5)
TOTAL IRON BINDING CAPACITY: 406 UG/DL (ref 250–450)
TRANSFERRIN SERPL-MCNC: 274 MG/DL (ref 200–375)
TRIGL SERPL-MCNC: 80 MG/DL (ref 30–150)
TSH SERPL DL<=0.005 MIU/L-ACNC: 1.76 UIU/ML (ref 0.4–4)
VIT B12 SERPL-MCNC: 506 PG/ML (ref 210–950)
WBC # BLD AUTO: 5.51 K/UL (ref 3.9–12.7)

## 2020-10-07 PROCEDURE — 80061 LIPID PANEL: CPT

## 2020-10-07 PROCEDURE — 86677 HELICOBACTER PYLORI ANTIBODY: CPT | Mod: PO

## 2020-10-07 PROCEDURE — 80048 BASIC METABOLIC PNL TOTAL CA: CPT | Mod: PO

## 2020-10-07 PROCEDURE — 85025 COMPLETE CBC W/AUTO DIFF WBC: CPT | Mod: PO

## 2020-10-07 PROCEDURE — 84425 ASSAY OF VITAMIN B-1: CPT | Mod: PO

## 2020-10-07 PROCEDURE — 83735 ASSAY OF MAGNESIUM: CPT | Mod: PO

## 2020-10-07 PROCEDURE — 84443 ASSAY THYROID STIM HORMONE: CPT | Mod: PO

## 2020-10-07 PROCEDURE — 84436 ASSAY OF TOTAL THYROXINE: CPT

## 2020-10-07 PROCEDURE — 36415 COLL VENOUS BLD VENIPUNCTURE: CPT | Mod: PO

## 2020-10-07 PROCEDURE — 82746 ASSAY OF FOLIC ACID SERUM: CPT | Mod: PO

## 2020-10-07 PROCEDURE — 84439 ASSAY OF FREE THYROXINE: CPT

## 2020-10-07 PROCEDURE — 80076 HEPATIC FUNCTION PANEL: CPT | Mod: PO

## 2020-10-07 PROCEDURE — 82607 VITAMIN B-12: CPT | Mod: PO

## 2020-10-07 PROCEDURE — 83036 HEMOGLOBIN GLYCOSYLATED A1C: CPT

## 2020-10-07 PROCEDURE — 82306 VITAMIN D 25 HYDROXY: CPT | Mod: PO

## 2020-10-07 PROCEDURE — 83540 ASSAY OF IRON: CPT | Mod: PO

## 2020-10-07 PROCEDURE — 84100 ASSAY OF PHOSPHORUS: CPT | Mod: PO

## 2020-10-07 PROCEDURE — 84480 ASSAY TRIIODOTHYRONINE (T3): CPT | Mod: PO

## 2020-10-08 ENCOUNTER — TELEPHONE (OUTPATIENT)
Dept: BARIATRICS | Facility: CLINIC | Age: 45
End: 2020-10-08

## 2020-10-08 DIAGNOSIS — R79.89 LOW VITAMIN D LEVEL: ICD-10-CM

## 2020-10-08 DIAGNOSIS — A04.8 POSITIVE H. PYLORI TEST: Primary | ICD-10-CM

## 2020-10-08 DIAGNOSIS — E66.01 MORBID OBESITY WITH BMI OF 60.0-69.9, ADULT: Primary | ICD-10-CM

## 2020-10-08 LAB — H PYLORI IGG SERPL QL IA: POSITIVE

## 2020-10-08 RX ORDER — CLARITHROMYCIN 500 MG/1
500 TABLET, FILM COATED ORAL EVERY 12 HOURS
Qty: 28 TABLET | Refills: 0 | Status: SHIPPED | OUTPATIENT
Start: 2020-10-08 | End: 2020-10-22

## 2020-10-08 RX ORDER — OMEPRAZOLE 20 MG/1
20 CAPSULE, DELAYED RELEASE ORAL 2 TIMES DAILY
Qty: 28 CAPSULE | Refills: 0 | Status: SHIPPED | OUTPATIENT
Start: 2020-10-08 | End: 2020-10-22

## 2020-10-08 RX ORDER — AMOXICILLIN 500 MG/1
1000 TABLET, FILM COATED ORAL EVERY 12 HOURS
Qty: 56 TABLET | Refills: 0 | Status: SHIPPED | OUTPATIENT
Start: 2020-10-08 | End: 2020-10-22

## 2020-10-08 RX ORDER — ERGOCALCIFEROL 1.25 MG/1
50000 CAPSULE ORAL
Qty: 12 CAPSULE | Refills: 0 | Status: SHIPPED | OUTPATIENT
Start: 2020-10-08 | End: 2020-12-25

## 2020-10-08 NOTE — TELEPHONE ENCOUNTER
----- Message from Nico Tobar PA-C sent at 10/8/2020  1:34 PM CDT -----  + H. Pylori , pt notified via [facility]. Rx sent to pharmacy.    Please make sure pt picks up prescription and takes.    Thanks,  BM

## 2020-10-08 NOTE — TELEPHONE ENCOUNTER
I called the Pt. To notify her of the medication that was sent to the pharmacy by Nico Tobar. Pt verbalized understanding and will  the medication and take it as prescribed.

## 2020-10-13 LAB — VIT B1 BLD-MCNC: 41 UG/L (ref 38–122)

## 2020-10-16 ENCOUNTER — OFFICE VISIT (OUTPATIENT)
Dept: PSYCHIATRY | Facility: CLINIC | Age: 45
End: 2020-10-16
Payer: COMMERCIAL

## 2020-10-16 DIAGNOSIS — E66.01 MORBID OBESITY WITH BMI OF 60.0-69.9, ADULT: ICD-10-CM

## 2020-10-16 DIAGNOSIS — E66.01 MORBID OBESITY DUE TO EXCESS CALORIES: ICD-10-CM

## 2020-10-16 DIAGNOSIS — Z01.818 PREOPERATIVE EVALUATION TO RULE OUT SURGICAL CONTRAINDICATION: Primary | ICD-10-CM

## 2020-10-16 PROCEDURE — 90791 PSYCH DIAGNOSTIC EVALUATION: CPT | Mod: 95,,, | Performed by: PSYCHOLOGIST

## 2020-10-16 PROCEDURE — 90791 PR PSYCHIATRIC DIAGNOSTIC EVALUATION: ICD-10-PCS | Mod: 95,,, | Performed by: PSYCHOLOGIST

## 2020-10-16 NOTE — PROGRESS NOTES
Psychiatry Initial Visit (PhD/LCSW)   Diagnostic Interview - CPT 26484     Date: 10/16/2020    The patient location is: home ([place], [place])  The chief complaint leading to consultation is: presurgical psychological evaluation    Visit type: audiovisual    Face to Face time with patient: 60  90 minutes of total time spent on the encounter, which includes face to face time and non-face to face time preparing to see the patient (eg, review of tests), Obtaining and/or reviewing separately obtained history, Documenting clinical information in the electronic or other health record, Independently interpreting results (not separately reported) and communicating results to the patient/family/caregiver, or Care coordination (not separately reported).         Each patient to whom he or she provides medical services by telemedicine is:  (1) informed of the relationship between the physician and patient and the respective role of any other health care provider with respect to management of the patient; and (2) notified that he or she may decline to receive medical services by telemedicine and may withdraw from such care at any time.         Referral source: Papa Arrington M.D.    Clinical status of patient: Outpatient     Roshan Zaidi, a 45 y.o. male, presented for initial evaluation visit. Before this evaluation was initiated, the purposes and process of the assessment and the limits of confidentiality were discussed with the patient who expressed understanding of these issues and orally consented to proceed with the evaluation.     Chief complaint/reason for encounter: Routine psychological evaluation prior to bariatric surgery.     Type of surgery sought: Sleeve    History of present illness: Mr. Zaidi is a 45-year-old  male who is pursuing bariatric surgery to improve his health and quality of life. He has no history of significant psychological difficulties. He has never taken psychotropic  "medication, has never been hospitalized for psychiatric reasons, and denied any history of suicidality. He has begun making positive lifestyle changes in anticipation for surgery, with good benefit. He has a Body Mass Index of 55 as documented by the referring provider.    Mr. Zaidi has struggled with weight for his whole life. Factors that have contributed to his weight gain over the years include: eating a lot of fast food as a  ("not have the time to eat healthy because working odd hours"), skipping meals, lack of meal preparation, snacking on junk food, leading a very sedentary lifestyle. He does not consider himself to be an emotional eater. He has tried weight loss methods on his own (i.e. Keto, exercise) with some success, but fell off of the diet when his hours changed to be much longer. His motivation for seeking surgery now is to improve his health, prevent disease (his mother  of complications from diabetes) and extend his lifespan. His postsurgical goals include having more energy to play with his son.      Mr. Zaidi has met with Ms. Mariah Oro RD, bariatric dietician, and reports that he has made the following lifestyle changes since beginning the bariatric program: more meal preparation, smaller portions, bringing fruits with him to work for snacks, cutting down on fast foods. He has been cleared by Ms. Oro to pursue surgery.    Co-morbidities: N/A     Knowledge of surgery information:  - Basics of procedure: Y - "cut out portion of stomach".  - Risks: Y - "death, stretching stomach back out if you don't do what you're supposed to".  - Basics of diet: Y - "liquid diet at first, protein shakes, staying away from starches and sugar".    Pain: 0/10    Psychiatric Symptoms:   Depression - denied significant symptoms of depression.   Florence/Hypomania - denied significant symptoms of florence/hypomania.  Anxiety - denied significant symptoms of anxiety.   Thoughts - denied " delusions, hallucinations.  Suicidal thoughts/behaviors - denied.  Substance abuse - denied abuse or dependence.   Sleep - usually fine, but erratic in terms of hours.  Self-injury - denied.    Current psychiatric treatment:  Medications: None.    Psychotherapy: None.    Treating clinicians: N/A    Health behaviors: Reported adherence to pharmacologic regimen.      Psychiatric history:   Previous diagnosis: Denies.    Previous hospitalizations: Denies.     History of outpatient treatment: Went to marriage counseling briefly about 7 years ago.    Previous suicide attempt: Denies.      Trauma history:  - His mother  when he was 16 years old of complications from diabetes. As a result of her death, he dropped out of high school and lived with different family members.      History of eating disorders:  History of bulimia: Denies.    History of binge-eating episodes: Denies.      Family history of psychiatric illness: None reported.     Social history (marriage, employment, etc.): Mr. Zaidi was born and raised in [place] by his biological mother, and he is the youngest of 9 siblings. He had no relationship with his biological father as a child, but knows him now more as an adult. His mother  when he was 16 of a diabetic coma. He stayed with various family members after her death. He believes he had a really good childhood and thinks upon it fondly - surrounded by a lot of friends and family. He denies any experience of abuse as a child. He dropped out of high school after his mother , but he ended up getting his GED and some culinary school. 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 Mr. Zaidi has been  for 10 years. He has 2 children - ages 20 and 11. He lives in [facility] with his wife and son. He does not have a [employer] affiliation.    Current psychosocial stressors: He is working on building a new house, leaving his company  "and going out of his own - change that is exciting but nerve wracking at the same time.    Report of coping skills: Spend time with dogs, play sports with son, sit on patio.    Support system: The only person he has told about surgery is his wife. His wife had sleeve a few years ago and has stayed on the program. He has also spoken to his son (age 11) about changes they will be making in the household. He will take his niece and a few friends as well.     Substance use:   Alcohol: Social, casual - "not even once a month"; denied history of abuse or dependency.   Drugs: Denied current use; denied history of abuse or dependency.  Tobacco: None.   Caffeine: Drinks 1 energy drink per day.    Current medications and drug reactions (include OTC, herbal): see medication list     Strengths and liabilities: Strength: Patient accepts guidance/feedback, Strength: Patient is expressive/articulate., Strength: Patient is intelligent., Strength: Patient is motivated for change., Strength: Patient has positive support network., Strength: Patient has reasonable judgment., Strength: Patient is stable.     Current Evaluation:    Mental Status Exam:   General Appearance:  age appropriate, well dressed, neatly groomed, overweight    Speech:  normal tone, normal rate, normal pitch, normal volume    Level of Cooperation:  cooperative    Thought Processes:  normal and logical    Mood:  euthymic    Thought Content:  normal, no suicidality, no homicidality, delusions, or paranoia    Affect:  congruent and appropriate    Orientation:  oriented x3    Memory:  recent memory intact; immediate and delayed word recall 3/3  remote memory intact; able to recall remote personal events   Attention Span & Concentration:  spelled "HOUSE" forwards and backwards without errors   Fund of General Knowledge:  appropriate for education    Abstract Reasoning:  interpretation of proverbs was abstract; interpretation of similarities was abstract   Judgment & " "Insight:  good    Language  intact        Diagnostic Impression - Plan:      Diagnostic Impression:  Z01.818 Pre-operative examination   E66.01   Morbid obesity  Z68.43    Body Mass Index of 55      Summary/Conclusion:   There are no overt psychological contraindications for proceeding with bariatric surgery. Mr. Zaidi has no significant psychiatric history and reports no current psychiatric problems or major adjustment issues. He has reasonable expectations for surgery, good social support, and has already begun making appropriate lifestyle changes in anticipation for surgery. He has verbalized appropriate awareness and commitment to the necessary behavioral changes associated with bariatric surgery and appears willing to comply with long-term lifestyle changes.     Recommendations:  -This patient is psychologically cleared to proceed with bariatric surgery.  -There are no recommendations for psychological treatment at this time. He did mention that he might be interested in pursuing occasional counseling sessions after therapy as he would find it helpful to "have someone to talk to sometimes". He was provided with information to contact a mental health provider in the future.  "

## 2020-11-02 ENCOUNTER — PATIENT MESSAGE (OUTPATIENT)
Dept: BARIATRICS | Facility: CLINIC | Age: 45
End: 2020-11-02

## 2020-11-10 ENCOUNTER — TELEPHONE (OUTPATIENT)
Dept: BARIATRICS | Facility: CLINIC | Age: 45
End: 2020-11-10

## 2020-11-10 DIAGNOSIS — E66.01 MORBID OBESITY: Primary | ICD-10-CM

## 2020-11-10 NOTE — TELEPHONE ENCOUNTER
Spoke with patient and confirmed the surgical procedure of sleeve with Dr Arrington.  Scheduled preop appts/ surgery date/2 week and 8 week post op appts . All dates and times agreed upon. Pt aware that if they are required to have PCP clearance, it must be within 60 days of surgery- it should be dated, signed and in chart for preop appointment. All medications have been reviewed regarding the necessity to be crushed or broken into pieces smaller that the tip of a pencil eraser for 2 weeks following gastric sleeve surgery and 4 weeks following gastric bypass surgery.  Pt instructed to stop taking all NSAIDS 1 week before surgery and for life after surgery.  Pt aware that protein liquid diet start date is 11/18/2020.  Screened patient for history of UTI per protocol.   Discussed with patient to avoid antibiotics and elective procedures involving sedation/anesthesia within 30 days of surgery unless cleared by the bariatric department.  Patient instructed to call the bariatric clinic post op for any s/s of UTI.  Patient's mailing address confirmed and informed to expect a manilla envelop containing bariatric surgery pre op booklet, appointment reminders, protein and fluid log sheets, and liquid diet and vitamin information sheets.  Pt aware that all appts can be seen in my [facility] patient portal at this time.  Confirmed email address and informed patient that they will be enrolled in the Patient Reported Outcomes program to track her progress and successes.  The first email will be sent 2-3 weeks before surgery and then every year on your surgery anniversary date.   Office phone and fax number given to patient for any future questions/concerns. Will schedule covid test at [facility].

## 2020-11-16 ENCOUNTER — PATIENT OUTREACH (OUTPATIENT)
Dept: ADMINISTRATIVE | Facility: OTHER | Age: 45
End: 2020-11-16

## 2020-11-16 NOTE — PROGRESS NOTES
LINKS immunization registry not responding  Health Maintenance updated  Chart reviewed for overdue Proactive [facility] Encounters ([facility]) health maintenance testing (CRS, Breast Ca, Diabetic Eye Exam)   Orders entered:N/A

## 2020-11-17 ENCOUNTER — CLINICAL SUPPORT (OUTPATIENT)
Dept: BARIATRICS | Facility: CLINIC | Age: 45
End: 2020-11-17

## 2020-11-17 ENCOUNTER — OFFICE VISIT (OUTPATIENT)
Dept: BARIATRICS | Facility: CLINIC | Age: 45
End: 2020-11-17

## 2020-11-17 VITALS
BODY MASS INDEX: 42.66 KG/M2 | HEIGHT: 72 IN | DIASTOLIC BLOOD PRESSURE: 74 MMHG | OXYGEN SATURATION: 99 % | HEART RATE: 82 BPM | SYSTOLIC BLOOD PRESSURE: 155 MMHG | WEIGHT: 315 LBS

## 2020-11-17 DIAGNOSIS — E66.01 MORBID OBESITY: Primary | ICD-10-CM

## 2020-11-17 PROCEDURE — 99999 PR PBB SHADOW E&M-EST. PATIENT-LVL III: CPT | Mod: PBBFAC,,, | Performed by: SURGERY

## 2020-11-17 PROCEDURE — 99499 UNLISTED E&M SERVICE: CPT | Mod: S$GLB,,, | Performed by: DIETITIAN, REGISTERED

## 2020-11-17 PROCEDURE — 99214 PR OFFICE/OUTPT VISIT, EST, LEVL IV, 30-39 MIN: ICD-10-PCS | Mod: S$GLB,,, | Performed by: SURGERY

## 2020-11-17 PROCEDURE — 99499 NO LOS: ICD-10-PCS | Mod: S$GLB,,, | Performed by: DIETITIAN, REGISTERED

## 2020-11-17 PROCEDURE — 99214 OFFICE O/P EST MOD 30 MIN: CPT | Mod: S$GLB,,, | Performed by: SURGERY

## 2020-11-17 PROCEDURE — 99999 PR PBB SHADOW E&M-EST. PATIENT-LVL III: ICD-10-PCS | Mod: PBBFAC,,, | Performed by: SURGERY

## 2020-11-17 RX ORDER — URSODIOL 500 MG/1
TABLET, FILM COATED ORAL
Qty: 90 TABLET | Refills: 1 | Status: SHIPPED | OUTPATIENT
Start: 2020-11-17 | End: 2021-01-27 | Stop reason: ALTCHOICE

## 2020-11-17 RX ORDER — METOCLOPRAMIDE HYDROCHLORIDE 5 MG/ML
10 INJECTION INTRAMUSCULAR; INTRAVENOUS ONCE
Status: CANCELLED | OUTPATIENT
Start: 2020-11-17 | End: 2020-11-17

## 2020-11-17 RX ORDER — HYDROCODONE BITARTRATE AND ACETAMINOPHEN 7.5; 325 MG/15ML; MG/15ML
15 SOLUTION ORAL 4 TIMES DAILY PRN
Qty: 200 ML | Refills: 0 | Status: SHIPPED | OUTPATIENT
Start: 2020-11-17

## 2020-11-17 RX ORDER — FAMOTIDINE 10 MG/ML
20 INJECTION INTRAVENOUS ONCE
Status: CANCELLED | OUTPATIENT
Start: 2020-11-17 | End: 2020-11-17

## 2020-11-17 RX ORDER — HEPARIN SODIUM 5000 [USP'U]/ML
5000 INJECTION, SOLUTION INTRAVENOUS; SUBCUTANEOUS ONCE
Status: CANCELLED | OUTPATIENT
Start: 2020-11-17 | End: 2020-11-17

## 2020-11-17 RX ORDER — SODIUM CITRATE AND CITRIC ACID MONOHYDRATE 334; 500 MG/5ML; MG/5ML
15 SOLUTION ORAL ONCE
Status: CANCELLED | OUTPATIENT
Start: 2020-11-17 | End: 2020-11-17

## 2020-11-17 RX ORDER — POLYETHYLENE GLYCOL 3350 17 G/17G
17 POWDER, FOR SOLUTION ORAL DAILY
Qty: 510 G | Refills: 3 | Status: SHIPPED | OUTPATIENT
Start: 2020-11-17

## 2020-11-17 RX ORDER — ONDANSETRON 8 MG/1
8 TABLET, ORALLY DISINTEGRATING ORAL EVERY 6 HOURS PRN
Qty: 30 TABLET | Refills: 0 | Status: SHIPPED | OUTPATIENT
Start: 2020-11-17

## 2020-11-17 RX ORDER — OMEPRAZOLE 40 MG/1
40 CAPSULE, DELAYED RELEASE ORAL EVERY MORNING
Qty: 30 CAPSULE | Refills: 2 | Status: SHIPPED | OUTPATIENT
Start: 2020-11-17 | End: 2021-02-15

## 2020-11-17 NOTE — PROGRESS NOTES
NUTRITION NOTE    Bariatric Surgeon: Papa Arrington M.D.  Reason for MNT Referral: Pre-op liquid diet and nutrition instructions  Sleeve   Date of Surgery: 12/2/2020    Pre-op liquid diet  Pt using 100% whey protein isolate  for preop liquid diet    Discussion:  -  gms of protein per day  - 600-800 calories per day   - Less than 4gm sugar per shake  - SF, Decaf, non-carbonated Fluids  - No Fruits, juices, yogurt or pudding on liquid diet  - No vitamins for 1 week prior to surgery  - No herbal supplements including green tea and fish oils for 2 weeks prior to surgery    Remind pt per nursing and medical team to inform our department if taking antibiotics within the 30 days post bariatric surgery or it any other surgeries/procedures are scheduled within 30 days after bariatric surgery.    2 week post-op instructions  Pt will use whey protein isolate powder protein shakes for first three days after surgery.  If using protein powder, reminded pt of mixing with water for days 1 and 2, by day 3 may try using skim, 1% milk or unsweetened almond/soy milk.  By Day 4, may use RTD protein shakes as tolerated  Pt has the following vitamins and minerals to start taking once discharged from hospital  Multivitamin with 18 mg iron take one tablet or chewable twice a day- needs to purchase  B-complex with 50 mg thiamin taken once daily- needs to purchase  Calcium citrate 500 mg with vitamin D three times per day needs to purchase  Vitamin B-12 500 mcg  Sublingually daily- has however does not know dosage.  Reviewed dosage and timing of vitamin/mineral guidelines.  Reviewed nutritional guidelines for protein and fluid requirements for week 1 and week 2 post-surgery.  Handout provided to log protein and fluid daily.  1 ounce medicine cups provided for patient to measure fluid intake after surgery.  Reviewed common nutritional concerns and prevention tips after bariatric surgery.  Reminded not to lift anything greater than  10 lbs for 6 week post-surgery  Pt verbalized understanding of information provided with appropriate questions and comments.         SESSION TIME: 30 minutes

## 2020-11-17 NOTE — H&P (VIEW-ONLY)
History & Physical    SUBJECTIVE:     History of Present Illness:  Roshan Zaidi is a 45 y.o. male who presents for pre-operative exam for weight loss surgery.  he has completed him pre-operative evaluation.  he has failed medical treatment for obesity.  1. Morbid Obesity with Body mass index is 57.50 kg/m². and inability to lose and maintain weight loss.  2. Co-morbidities: insulin resistance, BARNHART, vit d deficiency and hpylori under treatment    Chief Complaint   Patient presents with    Pre-op Exam       Review of patient's allergies indicates:   Allergen Reactions    Shrimp Hives and Shortness Of Breath       Current Outpatient Medications   Medication Sig    ergocalciferol (ERGOCALCIFEROL) 50,000 unit Cap Take 1 capsule (50,000 Units total) by mouth every 7 days. for 12 doses    sildenafiL (VIAGRA) 100 MG tablet Take 1 tablet (100 mg total) by mouth daily as needed for Erectile Dysfunction.    omeprazole (PRILOSEC) 20 MG capsule Take 1 capsule (20 mg total) by mouth 2 (two) times daily. Take with amoxil and biaxin for 14 days (Patient not taking: Reported on 11/17/2020)     Current Facility-Administered Medications   Medication    DOBUTamine 1000 mg in D5W 250 mL infusion (premix titrating)       History reviewed. No pertinent past medical history.    Past Surgical History:   Procedure Laterality Date    EYE SURGERY      TIBIA FRACTURE SURGERY Bilateral        Review of Systems   Constitutional: Negative for fever.   HENT: Negative for congestion.    Respiratory: Negative for cough and shortness of breath.    Cardiovascular: Negative for chest pain and palpitations.   Gastrointestinal: Positive for heartburn. Negative for abdominal pain, constipation, diarrhea, nausea and vomiting.        Gets rare heartburn (extremely rare)   Genitourinary: Negative for dysuria and urgency.   Skin: Negative for rash.   Endo/Heme/Allergies: Does not bruise/bleed easily.          OBJECTIVE:     Vitals:    11/17/20 1441    Pulse: 82   SpO2: 99%   Weight: (!) 192.3 kg (423 lb 15.1 oz)   Height: 6' (1.829 m)       Physical Exam   Constitutional: He is oriented to person, place, and time and well-developed, well-nourished, and in no distress.   Neurological: He is alert and oriented to person, place, and time.   Skin: Skin is warm and dry.   Psychiatric: Mood, memory, affect and judgment normal.   Vitals reviewed.       Laboratory  CBC: Reviewed  CMP: Reviewed    Diagnostic Results:  ECG: Reviewed  X-Ray: Reviewed     Dietitian: Patient has participated in pre-operative nutritional program with understanding of necessary lifelong dietary changes required with surgery.    Psych: No overt contraindications to bariatric surgery, patient has completed psychological evaluation and is able to give informed consent.    Clearance: pcp    ASSESSMENT/PLAN:     Morbid obesity with failure of medical conservative therapy.    Co Morbid Conditions:   Patient Active Problem List   Diagnosis    Morbid obesity       Patient wishes to undergo sleeve gastrectomy .      The patient was informed that risks include, but are not limited to: death, leak, obstruction, bleeding, and sepsis. Any of these could require further surgery. Other risks include DVT, PE, pneumonia, wound dehiscence, hernia, wound infection, the need for dilatations and the inability to lose appropriate weight and keep it off.     We discussed that our goal is to ameliorate his medical problems and not to obtain a specific body mass index. he understands the risks and benefits and wishes to proceed with the procedure.  he has signed a consent form.

## 2020-11-29 ENCOUNTER — LAB VISIT (OUTPATIENT)
Dept: SPORTS MEDICINE | Facility: CLINIC | Age: 45
End: 2020-11-29
Payer: COMMERCIAL

## 2020-11-29 DIAGNOSIS — E66.01 MORBID OBESITY: ICD-10-CM

## 2020-11-29 LAB — SARS-COV-2 RNA RESP QL NAA+PROBE: NOT DETECTED

## 2020-11-29 PROCEDURE — U0003 INFECTIOUS AGENT DETECTION BY NUCLEIC ACID (DNA OR RNA); SEVERE ACUTE RESPIRATORY SYNDROME CORONAVIRUS 2 (SARS-COV-2) (CORONAVIRUS DISEASE [COVID-19]), AMPLIFIED PROBE TECHNIQUE, MAKING USE OF HIGH THROUGHPUT TECHNOLOGIES AS DESCRIBED BY CMS-2020-01-R: HCPCS

## 2020-12-01 ENCOUNTER — ANESTHESIA EVENT (OUTPATIENT)
Dept: SURGERY | Facility: HOSPITAL | Age: 45
DRG: 621 | End: 2020-12-01

## 2020-12-01 ENCOUNTER — TELEPHONE (OUTPATIENT)
Dept: BARIATRICS | Facility: CLINIC | Age: 45
End: 2020-12-01

## 2020-12-01 NOTE — PRE-PROCEDURE INSTRUCTIONS
PREOP INSTRUCTIONS:No solid food ,milk or milk products for 8 hours prior to procedure.Clear liquids are allowed up to 2 hours before procedure.Clear liquids are:water,apple juice,gatorade & powerade.Instructed to follow the surgeon's instructions if they differ from these.Shower instructions as well as directions to the Surgery Center were given.Encouraged to wear loose fitting,comfortable clothing.Medication instructions for pm prior to and am of procedure reviewed.Instructed to avoid taking vitamins,supplements,aspirin and ibuprofen the morning of surgery. Patient's questions and concerns addressed .Patient informed of the current visitor policy and advised patient that one visitor may accompany each patient into the hospital and wait (socially distanced) until a member of the medical team provides an update at the conclusion of the procedure.When they enter the hospital both patient and visitor will have their temperature checked.All visitors are asked to arrive with a mask and to keep their mask on throughout the visit. Each inpatient is allowed 1 visitor at a time per day between the hours of 8am and 6pm.This visitor must remain in the patient's room and not gather in common areas such as waiting rooms or cafeterias.The visitor must be 18 years or older and arrive with a mask and keep the mask on throughout the visit.    Patient denies any side effects or issues with anesthesia or sedation.     Patient does not know arrival time.Explained that this information comes from the surgeon's office and if they haven't heard from them by 2 or 3 pm to call the office.Patient stated an understanding.

## 2020-12-01 NOTE — TELEPHONE ENCOUNTER
Notified patient of arrival time to the [facility] at 0815 with expected surgery start time 1020 on 12/2/2020.   Instructed patient regarding pre-op instructions including no protein shakes or sugar free clear liquids after midnight but can have a rare sip of water for comfort, showering and preop medications to hold/take per anesthesia/preop.  Instructed patient on the s/s of dehydration and for patient to call at the first sign of dehydration.  Informed patient that someone from bariatrics will call them 1 week post op to review diet/fluid intake and to ensure adequate hydration.    Reminded patient not to take antibiotics for 30 days following surgery or schedule elective procedures involving anesthesia/sedation for 30 days following surgery unless checking with the bariatric clinic first.  Pt verbalized understanding. Pt given office phone number for any additional questions/concerns.

## 2020-12-02 ENCOUNTER — HOSPITAL ENCOUNTER (INPATIENT)
Facility: HOSPITAL | Age: 45
LOS: 1 days | Discharge: HOME | DRG: 621 | End: 2020-12-03
Attending: SURGERY | Admitting: SURGERY

## 2020-12-02 ENCOUNTER — ANESTHESIA (OUTPATIENT)
Dept: SURGERY | Facility: HOSPITAL | Age: 45
DRG: 621 | End: 2020-12-02

## 2020-12-02 DIAGNOSIS — E66.01 MORBID OBESITY: Primary | ICD-10-CM

## 2020-12-02 PROBLEM — E66.9 OBESITY: Status: ACTIVE | Noted: 2020-12-02

## 2020-12-02 LAB — POCT GLUCOSE: 96 MG/DL (ref 70–110)

## 2020-12-02 PROCEDURE — 43775 PR LAP, GAST RESTRICT PROC, LONGITUDINAL GASTRECTOMY: ICD-10-PCS | Mod: ,,, | Performed by: SURGERY

## 2020-12-02 PROCEDURE — 71000033 HC RECOVERY, INTIAL HOUR: Performed by: SURGERY

## 2020-12-02 PROCEDURE — 11000001 HC ACUTE MED/SURG PRIVATE ROOM

## 2020-12-02 PROCEDURE — 88307 TISSUE EXAM BY PATHOLOGIST: CPT | Mod: 26,,, | Performed by: PATHOLOGY

## 2020-12-02 PROCEDURE — 88307 PR  SURG PATH,LEVEL V: ICD-10-PCS | Mod: 26,,, | Performed by: PATHOLOGY

## 2020-12-02 PROCEDURE — 25000003 PHARM REV CODE 250: Performed by: NURSE ANESTHETIST, CERTIFIED REGISTERED

## 2020-12-02 PROCEDURE — 25000003 PHARM REV CODE 250: Performed by: STUDENT IN AN ORGANIZED HEALTH CARE EDUCATION/TRAINING PROGRAM

## 2020-12-02 PROCEDURE — 94760 N-INVAS EAR/PLS OXIMETRY 1: CPT

## 2020-12-02 PROCEDURE — 71000015 HC POSTOP RECOV 1ST HR: Performed by: SURGERY

## 2020-12-02 PROCEDURE — 37000008 HC ANESTHESIA 1ST 15 MINUTES: Performed by: SURGERY

## 2020-12-02 PROCEDURE — 63600175 PHARM REV CODE 636 W HCPCS: Performed by: ANESTHESIOLOGY

## 2020-12-02 PROCEDURE — C9113 INJ PANTOPRAZOLE SODIUM, VIA: HCPCS | Performed by: STUDENT IN AN ORGANIZED HEALTH CARE EDUCATION/TRAINING PROGRAM

## 2020-12-02 PROCEDURE — 63600175 PHARM REV CODE 636 W HCPCS: Performed by: STUDENT IN AN ORGANIZED HEALTH CARE EDUCATION/TRAINING PROGRAM

## 2020-12-02 PROCEDURE — 27201423 OPTIME MED/SURG SUP & DEVICES STERILE SUPPLY: Performed by: SURGERY

## 2020-12-02 PROCEDURE — 63600175 PHARM REV CODE 636 W HCPCS: Performed by: NURSE ANESTHETIST, CERTIFIED REGISTERED

## 2020-12-02 PROCEDURE — 36000711: Performed by: SURGERY

## 2020-12-02 PROCEDURE — 82962 GLUCOSE BLOOD TEST: CPT | Performed by: SURGERY

## 2020-12-02 PROCEDURE — 36000710: Performed by: SURGERY

## 2020-12-02 PROCEDURE — D9220A PRA ANESTHESIA: ICD-10-PCS | Mod: ,,, | Performed by: ANESTHESIOLOGY

## 2020-12-02 PROCEDURE — 25000003 PHARM REV CODE 250: Performed by: SURGERY

## 2020-12-02 PROCEDURE — 37000009 HC ANESTHESIA EA ADD 15 MINS: Performed by: SURGERY

## 2020-12-02 PROCEDURE — 88307 TISSUE EXAM BY PATHOLOGIST: CPT | Performed by: PATHOLOGY

## 2020-12-02 PROCEDURE — 71000039 HC RECOVERY, EACH ADD'L HOUR: Performed by: SURGERY

## 2020-12-02 PROCEDURE — D9220A PRA ANESTHESIA: Mod: ,,, | Performed by: ANESTHESIOLOGY

## 2020-12-02 PROCEDURE — 43775 LAP SLEEVE GASTRECTOMY: CPT | Mod: ,,, | Performed by: SURGERY

## 2020-12-02 RX ORDER — DEXMEDETOMIDINE HYDROCHLORIDE 100 UG/ML
INJECTION, SOLUTION INTRAVENOUS
Status: DISCONTINUED | OUTPATIENT
Start: 2020-12-02 | End: 2020-12-02

## 2020-12-02 RX ORDER — FAMOTIDINE 10 MG/ML
20 INJECTION INTRAVENOUS ONCE
Status: DISCONTINUED | OUTPATIENT
Start: 2020-12-02 | End: 2020-12-02

## 2020-12-02 RX ORDER — HYDROMORPHONE HYDROCHLORIDE 1 MG/ML
0.2 INJECTION, SOLUTION INTRAMUSCULAR; INTRAVENOUS; SUBCUTANEOUS EVERY 5 MIN PRN
Status: DISCONTINUED | OUTPATIENT
Start: 2020-12-02 | End: 2020-12-02

## 2020-12-02 RX ORDER — ONDANSETRON 2 MG/ML
8 INJECTION INTRAMUSCULAR; INTRAVENOUS EVERY 6 HOURS PRN
Status: DISCONTINUED | OUTPATIENT
Start: 2020-12-02 | End: 2020-12-03 | Stop reason: HOSPADM

## 2020-12-02 RX ORDER — ENOXAPARIN SODIUM 100 MG/ML
40 INJECTION SUBCUTANEOUS
Status: DISCONTINUED | OUTPATIENT
Start: 2020-12-02 | End: 2020-12-03 | Stop reason: HOSPADM

## 2020-12-02 RX ORDER — MEPERIDINE HYDROCHLORIDE 50 MG/ML
12.5 INJECTION INTRAMUSCULAR; INTRAVENOUS; SUBCUTANEOUS ONCE AS NEEDED
Status: DISCONTINUED | OUTPATIENT
Start: 2020-12-02 | End: 2020-12-02

## 2020-12-02 RX ORDER — LIDOCAINE HYDROCHLORIDE 10 MG/ML
1 INJECTION, SOLUTION EPIDURAL; INFILTRATION; INTRACAUDAL; PERINEURAL ONCE
Status: DISCONTINUED | OUTPATIENT
Start: 2020-12-02 | End: 2020-12-02

## 2020-12-02 RX ORDER — MIDAZOLAM HYDROCHLORIDE 1 MG/ML
INJECTION, SOLUTION INTRAMUSCULAR; INTRAVENOUS
Status: DISCONTINUED | OUTPATIENT
Start: 2020-12-02 | End: 2020-12-02

## 2020-12-02 RX ORDER — SODIUM CHLORIDE 9 MG/ML
INJECTION, SOLUTION INTRAVENOUS CONTINUOUS
Status: DISCONTINUED | OUTPATIENT
Start: 2020-12-02 | End: 2020-12-02

## 2020-12-02 RX ORDER — SODIUM CITRATE AND CITRIC ACID MONOHYDRATE 334; 500 MG/5ML; MG/5ML
15 SOLUTION ORAL ONCE
Status: DISCONTINUED | OUTPATIENT
Start: 2020-12-02 | End: 2020-12-02

## 2020-12-02 RX ORDER — HYDROMORPHONE HYDROCHLORIDE 2 MG/ML
INJECTION, SOLUTION INTRAMUSCULAR; INTRAVENOUS; SUBCUTANEOUS
Status: DISCONTINUED | OUTPATIENT
Start: 2020-12-02 | End: 2020-12-02

## 2020-12-02 RX ORDER — LIDOCAINE HYDROCHLORIDE 10 MG/ML
1 INJECTION, SOLUTION EPIDURAL; INFILTRATION; INTRACAUDAL; PERINEURAL ONCE
Status: COMPLETED | OUTPATIENT
Start: 2020-12-02 | End: 2020-12-02

## 2020-12-02 RX ORDER — HEPARIN SODIUM 5000 [USP'U]/ML
5000 INJECTION, SOLUTION INTRAVENOUS; SUBCUTANEOUS ONCE
Status: COMPLETED | OUTPATIENT
Start: 2020-12-02 | End: 2020-12-02

## 2020-12-02 RX ORDER — ACETAMINOPHEN 10 MG/ML
INJECTION, SOLUTION INTRAVENOUS
Status: DISCONTINUED | OUTPATIENT
Start: 2020-12-02 | End: 2020-12-02

## 2020-12-02 RX ORDER — NEOSTIGMINE METHYLSULFATE 0.5 MG/ML
INJECTION, SOLUTION INTRAVENOUS
Status: DISCONTINUED | OUTPATIENT
Start: 2020-12-02 | End: 2020-12-02

## 2020-12-02 RX ORDER — HYDROCODONE BITARTRATE AND ACETAMINOPHEN 7.5; 325 MG/15ML; MG/15ML
15 SOLUTION ORAL EVERY 4 HOURS PRN
Status: DISCONTINUED | OUTPATIENT
Start: 2020-12-02 | End: 2020-12-03 | Stop reason: HOSPADM

## 2020-12-02 RX ORDER — SODIUM CITRATE AND CITRIC ACID MONOHYDRATE 334; 500 MG/5ML; MG/5ML
30 SOLUTION ORAL ONCE
Status: COMPLETED | OUTPATIENT
Start: 2020-12-02 | End: 2020-12-02

## 2020-12-02 RX ORDER — HEPARIN SODIUM 5000 [USP'U]/ML
5000 INJECTION, SOLUTION INTRAVENOUS; SUBCUTANEOUS ONCE
Status: DISCONTINUED | OUTPATIENT
Start: 2020-12-02 | End: 2020-12-02

## 2020-12-02 RX ORDER — FENTANYL CITRATE 50 UG/ML
INJECTION, SOLUTION INTRAMUSCULAR; INTRAVENOUS
Status: DISCONTINUED | OUTPATIENT
Start: 2020-12-02 | End: 2020-12-02

## 2020-12-02 RX ORDER — SODIUM CHLORIDE, SODIUM LACTATE, POTASSIUM CHLORIDE, CALCIUM CHLORIDE 600; 310; 30; 20 MG/100ML; MG/100ML; MG/100ML; MG/100ML
INJECTION, SOLUTION INTRAVENOUS CONTINUOUS
Status: DISCONTINUED | OUTPATIENT
Start: 2020-12-02 | End: 2020-12-03 | Stop reason: HOSPADM

## 2020-12-02 RX ORDER — HYDROCODONE BITARTRATE AND ACETAMINOPHEN 7.5; 325 MG/15ML; MG/15ML
15 SOLUTION ORAL EVERY 4 HOURS PRN
Status: DISCONTINUED | OUTPATIENT
Start: 2020-12-03 | End: 2020-12-02

## 2020-12-02 RX ORDER — LABETALOL HYDROCHLORIDE 5 MG/ML
INJECTION, SOLUTION INTRAVENOUS
Status: DISCONTINUED | OUTPATIENT
Start: 2020-12-02 | End: 2020-12-02

## 2020-12-02 RX ORDER — ACETAMINOPHEN 650 MG/20.3ML
500 LIQUID ORAL EVERY 8 HOURS
Status: DISCONTINUED | OUTPATIENT
Start: 2020-12-02 | End: 2020-12-03 | Stop reason: HOSPADM

## 2020-12-02 RX ORDER — MUPIROCIN 20 MG/G
OINTMENT TOPICAL
Status: DISCONTINUED | OUTPATIENT
Start: 2020-12-02 | End: 2020-12-02

## 2020-12-02 RX ORDER — LORAZEPAM 2 MG/ML
0.25 INJECTION INTRAMUSCULAR ONCE AS NEEDED
Status: DISCONTINUED | OUTPATIENT
Start: 2020-12-02 | End: 2020-12-02

## 2020-12-02 RX ORDER — KETAMINE HCL IN 0.9 % NACL 50 MG/5 ML
SYRINGE (ML) INTRAVENOUS
Status: DISCONTINUED | OUTPATIENT
Start: 2020-12-02 | End: 2020-12-02

## 2020-12-02 RX ORDER — ROCURONIUM BROMIDE 10 MG/ML
INJECTION, SOLUTION INTRAVENOUS
Status: DISCONTINUED | OUTPATIENT
Start: 2020-12-02 | End: 2020-12-02

## 2020-12-02 RX ORDER — GABAPENTIN 250 MG/5ML
300 SOLUTION ORAL 3 TIMES DAILY
Status: DISCONTINUED | OUTPATIENT
Start: 2020-12-02 | End: 2020-12-03 | Stop reason: HOSPADM

## 2020-12-02 RX ORDER — METOCLOPRAMIDE HYDROCHLORIDE 5 MG/ML
10 INJECTION INTRAMUSCULAR; INTRAVENOUS ONCE
Status: COMPLETED | OUTPATIENT
Start: 2020-12-02 | End: 2020-12-02

## 2020-12-02 RX ORDER — LIDOCAINE HYDROCHLORIDE 20 MG/ML
INJECTION, SOLUTION EPIDURAL; INFILTRATION; INTRACAUDAL; PERINEURAL
Status: DISCONTINUED | OUTPATIENT
Start: 2020-12-02 | End: 2020-12-02

## 2020-12-02 RX ORDER — PANTOPRAZOLE SODIUM 40 MG/10ML
40 INJECTION, POWDER, LYOPHILIZED, FOR SOLUTION INTRAVENOUS 2 TIMES DAILY
Status: DISCONTINUED | OUTPATIENT
Start: 2020-12-02 | End: 2020-12-03 | Stop reason: HOSPADM

## 2020-12-02 RX ORDER — HYDROMORPHONE HYDROCHLORIDE 1 MG/ML
0.5 INJECTION, SOLUTION INTRAMUSCULAR; INTRAVENOUS; SUBCUTANEOUS
Status: DISCONTINUED | OUTPATIENT
Start: 2020-12-02 | End: 2020-12-03 | Stop reason: HOSPADM

## 2020-12-02 RX ORDER — ONDANSETRON 2 MG/ML
INJECTION INTRAMUSCULAR; INTRAVENOUS
Status: DISCONTINUED | OUTPATIENT
Start: 2020-12-02 | End: 2020-12-02

## 2020-12-02 RX ORDER — FAMOTIDINE 10 MG/ML
20 INJECTION INTRAVENOUS ONCE
Status: COMPLETED | OUTPATIENT
Start: 2020-12-02 | End: 2020-12-02

## 2020-12-02 RX ORDER — PROPOFOL 10 MG/ML
VIAL (ML) INTRAVENOUS
Status: DISCONTINUED | OUTPATIENT
Start: 2020-12-02 | End: 2020-12-02

## 2020-12-02 RX ORDER — CEFAZOLIN SODIUM 1 G/3ML
2 INJECTION, POWDER, FOR SOLUTION INTRAMUSCULAR; INTRAVENOUS
Status: DISCONTINUED | OUTPATIENT
Start: 2020-12-02 | End: 2020-12-02

## 2020-12-02 RX ORDER — BUPIVACAINE HYDROCHLORIDE 2.5 MG/ML
INJECTION, SOLUTION EPIDURAL; INFILTRATION; INTRACAUDAL
Status: DISCONTINUED | OUTPATIENT
Start: 2020-12-02 | End: 2020-12-02 | Stop reason: HOSPADM

## 2020-12-02 RX ORDER — SODIUM CHLORIDE 9 MG/ML
INJECTION, SOLUTION INTRAVENOUS ONCE
Status: DISCONTINUED | OUTPATIENT
Start: 2020-12-02 | End: 2020-12-02

## 2020-12-02 RX ORDER — DEXTROMETHORPHAN/PSEUDOEPHED 2.5-7.5/.8
40 DROPS ORAL 4 TIMES DAILY PRN
Status: DISCONTINUED | OUTPATIENT
Start: 2020-12-02 | End: 2020-12-03 | Stop reason: HOSPADM

## 2020-12-02 RX ORDER — METOCLOPRAMIDE HYDROCHLORIDE 5 MG/ML
10 INJECTION INTRAMUSCULAR; INTRAVENOUS ONCE
Status: DISCONTINUED | OUTPATIENT
Start: 2020-12-02 | End: 2020-12-02

## 2020-12-02 RX ADMIN — HYDROMORPHONE HYDROCHLORIDE 0.2 MG: 1 INJECTION, SOLUTION INTRAMUSCULAR; INTRAVENOUS; SUBCUTANEOUS at 12:12

## 2020-12-02 RX ADMIN — ONDANSETRON 8 MG: 2 INJECTION INTRAMUSCULAR; INTRAVENOUS at 01:12

## 2020-12-02 RX ADMIN — ACETAMINOPHEN 1000 MG: 10 INJECTION, SOLUTION INTRAVENOUS at 11:12

## 2020-12-02 RX ADMIN — HYDROCODONE BITARTRATE AND ACETAMINOPHEN 15 ML: 7.5; 325 SOLUTION ORAL at 08:12

## 2020-12-02 RX ADMIN — GLYCOPYRROLATE 0.6 MG: 0.2 INJECTION INTRAMUSCULAR; INTRAVENOUS at 11:12

## 2020-12-02 RX ADMIN — SODIUM CHLORIDE 10 ML/HR: 0.9 INJECTION, SOLUTION INTRAVENOUS at 09:12

## 2020-12-02 RX ADMIN — LIDOCAINE HYDROCHLORIDE 1 MG: 10 INJECTION, SOLUTION EPIDURAL; INFILTRATION; INTRACAUDAL at 09:12

## 2020-12-02 RX ADMIN — Medication 25 MG: at 10:12

## 2020-12-02 RX ADMIN — METOCLOPRAMIDE 10 MG: 5 INJECTION, SOLUTION INTRAMUSCULAR; INTRAVENOUS at 09:12

## 2020-12-02 RX ADMIN — HYDROCODONE BITARTRATE AND ACETAMINOPHEN 15 ML: 7.5; 325 SOLUTION ORAL at 01:12

## 2020-12-02 RX ADMIN — DEXMEDETOMIDINE HYDROCHLORIDE 8 MCG: 100 INJECTION, SOLUTION, CONCENTRATE INTRAVENOUS at 11:12

## 2020-12-02 RX ADMIN — NEOSTIGMINE METHYLSULFATE 5 MG: 0.5 INJECTION, SOLUTION INTRAVENOUS at 11:12

## 2020-12-02 RX ADMIN — Medication 15 MG: at 11:12

## 2020-12-02 RX ADMIN — PANTOPRAZOLE SODIUM 40 MG: 40 INJECTION, POWDER, FOR SOLUTION INTRAVENOUS at 08:12

## 2020-12-02 RX ADMIN — MIDAZOLAM 2 MG: 1 INJECTION INTRAMUSCULAR; INTRAVENOUS at 10:12

## 2020-12-02 RX ADMIN — HYDROMORPHONE HYDROCHLORIDE 1 MG: 2 INJECTION, SOLUTION INTRAMUSCULAR; INTRAVENOUS; SUBCUTANEOUS at 11:12

## 2020-12-02 RX ADMIN — ROCURONIUM BROMIDE 50 MG: 10 INJECTION, SOLUTION INTRAVENOUS at 10:12

## 2020-12-02 RX ADMIN — HEPARIN SODIUM 5000 UNITS: 5000 INJECTION INTRAVENOUS; SUBCUTANEOUS at 09:12

## 2020-12-02 RX ADMIN — ONDANSETRON 8 MG: 2 INJECTION INTRAMUSCULAR; INTRAVENOUS at 08:12

## 2020-12-02 RX ADMIN — HYDROMORPHONE HYDROCHLORIDE 0.5 MG: 1 INJECTION, SOLUTION INTRAMUSCULAR; INTRAVENOUS; SUBCUTANEOUS at 02:12

## 2020-12-02 RX ADMIN — LABETALOL HYDROCHLORIDE 10 MG: 5 INJECTION, SOLUTION INTRAVENOUS at 12:12

## 2020-12-02 RX ADMIN — ACETAMINOPHEN 499.51 MG: 160 SOLUTION ORAL at 10:12

## 2020-12-02 RX ADMIN — CEFAZOLIN 50 ML: 1 INJECTION, POWDER, FOR SOLUTION INTRAVENOUS at 10:12

## 2020-12-02 RX ADMIN — FAMOTIDINE 20 MG: 10 INJECTION INTRAVENOUS at 09:12

## 2020-12-02 RX ADMIN — SODIUM CHLORIDE: 0.9 INJECTION, SOLUTION INTRAVENOUS at 11:12

## 2020-12-02 RX ADMIN — LIDOCAINE HYDROCHLORIDE 100 MG: 20 INJECTION, SOLUTION EPIDURAL; INFILTRATION; INTRACAUDAL; PERINEURAL at 10:12

## 2020-12-02 RX ADMIN — SODIUM CHLORIDE, SODIUM LACTATE, POTASSIUM CHLORIDE, AND CALCIUM CHLORIDE: .6; .31; .03; .02 INJECTION, SOLUTION INTRAVENOUS at 01:12

## 2020-12-02 RX ADMIN — SODIUM CITRATE AND CITRIC ACID MONOHYDRATE 30 ML: 500; 334 SOLUTION ORAL at 09:12

## 2020-12-02 RX ADMIN — Medication 10 MG: at 12:12

## 2020-12-02 RX ADMIN — PANTOPRAZOLE SODIUM 40 MG: 40 INJECTION, POWDER, FOR SOLUTION INTRAVENOUS at 12:12

## 2020-12-02 RX ADMIN — HYDROMORPHONE HYDROCHLORIDE 0.2 MG: 1 INJECTION, SOLUTION INTRAMUSCULAR; INTRAVENOUS; SUBCUTANEOUS at 01:12

## 2020-12-02 RX ADMIN — FENTANYL CITRATE 100 MCG: 50 INJECTION INTRAMUSCULAR; INTRAVENOUS at 10:12

## 2020-12-02 RX ADMIN — GABAPENTIN 300 MG: 250 SOLUTION ORAL at 08:12

## 2020-12-02 RX ADMIN — PROPOFOL 200 MG: 10 INJECTION, EMULSION INTRAVENOUS at 10:12

## 2020-12-02 RX ADMIN — ENOXAPARIN SODIUM 40 MG: 40 INJECTION SUBCUTANEOUS at 09:12

## 2020-12-02 RX ADMIN — ROCURONIUM BROMIDE 20 MG: 10 INJECTION, SOLUTION INTRAVENOUS at 11:12

## 2020-12-02 RX ADMIN — ONDANSETRON 4 MG: 2 INJECTION INTRAMUSCULAR; INTRAVENOUS at 11:12

## 2020-12-02 NOTE — ANESTHESIA PREPROCEDURE EVALUATION
12/02/2020  Roshan Zaidi III is a 45 y.o., male.    Anesthesia Evaluation    I have reviewed the Patient Summary Reports.    I have reviewed the Nursing Notes.       Review of Systems  Anesthesia Hx:  No problems with previous Anesthesia    Hematology/Oncology:  Hematology Normal   Oncology Normal     EENT/Dental:EENT/Dental Normal   Cardiovascular:  Cardiovascular Normal     Pulmonary:  Pulmonary Normal    Renal/:  Renal/ Normal     Hepatic/GI:  Hepatic/GI Normal    Musculoskeletal:  Musculoskeletal Normal    Neurological:  Neurology Normal    Endocrine:  Endocrine Normal    Dermatological:  Skin Normal    Psych:  Psychiatric Normal           Physical Exam  General:  Morbid Obesity    Airway/Jaw/Neck:  Airway Findings: Mouth Opening: Normal Tongue: Normal  General Airway Assessment: Adult  Mallampati: III  Improves to II with phonation.  TM Distance: Normal, at least 6 cm        Eyes/Ears/Nose:  EYES/EARS/NOSE FINDINGS: Normal   Dental:  Dental Findings: In tact   Chest/Lungs:  Chest/Lungs Clear    Heart/Vascular:  Heart Findings: Normal Heart murmur: negative Vascular Findings: Normal    Abdomen:  Abdomen Findings: Normal    Musculoskeletal:  Musculoskeletal Findings: Normal   Skin:  Skin Findings: Normal    Mental Status:  Mental Status Findings: Normal        Anesthesia Plan  Type of Anesthesia, risks & benefits discussed:  Anesthesia Type:  general  Patient's Preference:   Intra-op Monitoring Plan:   Intra-op Monitoring Plan Comments:   Post Op Pain Control Plan:   Post Op Pain Control Plan Comments:   Induction:   IV  Beta Blocker:  Patient is not currently on a Beta-Blocker (No further documentation required).       Informed Consent: Patient understands risks and agrees with Anesthesia plan.  Questions answered. Anesthesia consent signed with patient.  ASA Score: 2     Day of Surgery  Review of History & Physical:    H&P update referred to the surgeon.         Ready For Surgery From Anesthesia Perspective.

## 2020-12-02 NOTE — TRANSFER OF CARE
Anesthesia Transfer of Care Note    Patient: Roshan Zaidi III    Procedure(s) Performed: Procedure(s) (LRB):  GASTRECTOMY, SLEEVE, LAPAROSCOPIC, with intraop EGD (N/A)    Patient location: PACU    Anesthesia Type: general    Transport from OR: Transported from OR on 100% O2 by closed face mask with adequate spontaneous ventilation    Post pain: adequate analgesia    Post assessment: no apparent anesthetic complications    Post vital signs: stable    Level of consciousness: awake and alert    Nausea/Vomiting: no nausea/vomiting    Complications: none    Transfer of care protocol was followed      Last vitals:   Visit Vitals  BP (!) 199/91   Pulse 65   Temp 36.9 °C (98.4 °F) (Oral)   Resp 18   Ht 6' (1.829 m)   Wt (!) 182.8 kg (403 lb)   SpO2 100%   BMI 54.66 kg/m²

## 2020-12-02 NOTE — PLAN OF CARE
Problem: Adult Inpatient Plan of Care  Goal: Plan of Care Review  12/2/2020 1433 by Isela Terrazas RN  Outcome: Ongoing, Progressing  12/2/2020 1433 by Isela Terrazas RN  Outcome: Ongoing, Progressing     Problem: Adult Inpatient Plan of Care  Goal: Optimal Comfort and Wellbeing  Outcome: Ongoing, Progressing       Problem: Pain (Bariatric Surgery)  Goal: Acceptable Pain Control  Outcome: Ongoing, Progressing  Intervention: Prevent or Manage Pain  Flowsheets (Taken 12/2/2020 1433)  Diversional Activities:   smartphone   television  Pain Management Interventions:   care clustered   diversional activity provided   pain management plan reviewed with patient/caregiver   quiet environment facilitated     Problem: Fall Injury Risk  Goal: Absence of Fall and Fall-Related Injury  Outcome: Ongoing, Progressing  Intervention: Identify and Manage Contributors to Fall Injury Risk  Flowsheets (Taken 12/2/2020 1433)  Self-Care Promotion:   independence encouraged   BADL personal objects within reach  Medication Review/Management: medications reviewed  Intervention: Promote Injury-Free Environment  Flowsheets (Taken 12/2/2020 1433)  Safety Promotion/Fall Prevention: assistive device/personal item within reach  Environmental Safety Modification:   assistive device/personal items within reach   clutter free environment maintained    POC reviewed with pt, pt verbalized understanding. Pt is AAOx4. VS as charted. No falls or injury noted on shift. No s/s of breakdown as pt is independent with positioning self. Pain being monitored and controlled with PRN and scheduled meds. Pt has no complaints of N/V. Frequent rounds made for pt safety and care. Safety precautions remain, bed in lowest position, side rails up x2, call light and personal belongings within reach. Will continue to monitor.

## 2020-12-02 NOTE — ANESTHESIA PROCEDURE NOTES
Intubation  Performed by: Veronica Raymond CRNA  Authorized by: Ancelmo Simmons Jr., MD     Intubation:     Induction:  Intravenous    Intubated:  Postinduction    Mask Ventilation:  Easy with oral airway    Attempts:  1    Attempted By:  Student (JIMENEZ Luna)    Method of Intubation:  Direct    Blade:  Macdonald 2    Laryngeal View Grade: Grade I - full view of chords      Difficult Airway Encountered?: No      Complications:  None    Airway Device:  Oral endotracheal tube    Airway Device Size:  7.5    Style/Cuff Inflation:  Cuffed (inflated to minimal occlusive pressure)    Tube secured:  23    Secured at:  The lips    Placement Verified By:  Capnometry    Complicating Factors:  None    Findings Post-Intubation:  BS equal bilateral

## 2020-12-02 NOTE — ANESTHESIA POSTPROCEDURE EVALUATION
Anesthesia Post Evaluation    Patient: Roshan Zaidi III    Procedure(s) Performed: Procedure(s) (LRB):  GASTRECTOMY, SLEEVE, LAPAROSCOPIC, with intraop EGD (N/A)    Final Anesthesia Type: general    Patient location during evaluation: PACU  Patient participation: Yes- Able to Participate  Level of consciousness: awake and alert  Post-procedure vital signs: reviewed and stable  Pain management: adequate  Airway patency: patent    PONV status at discharge: No PONV  Anesthetic complications: no      Cardiovascular status: blood pressure returned to baseline  Respiratory status: spontaneous ventilation and room air  Hydration status: euvolemic  Follow-up not needed.          Vitals Value Taken Time   /91 12/02/20 1248   Temp 36.6 °C (97.9 °F) 12/02/20 1215   Pulse 69 12/02/20 1258   Resp 22 12/02/20 1258   SpO2 95 % 12/02/20 1258   Vitals shown include unvalidated device data.      No case tracking events are documented in the log.      Pain/Rita Score: Pain Rating Prior to Med Admin: 6 (12/2/2020 12:55 PM)  Rita Score: 10 (12/2/2020 12:45 PM)

## 2020-12-02 NOTE — OP NOTE
DATE OF PROCEDURE: 12/2/2020    PRE OP DIAGNOSIS: Morbid obesity [E66.01]  BMI 50.0-59.9, adult [Z68.43]    POST OP DIAGNOSIS: Morbid obesity [E66.01]  BMI 50.0-59.9, adult [Z68.43]    PROCEDURE: Procedure(s) (LRB):  GASTRECTOMY, SLEEVE, LAPAROSCOPIC, with intraop EGD (N/A)    Surgeon(s) and Role:     * Papa Arrington MD - Primary     * Sacha Mccurdy MD - Resident - AssistingANESTHESIA: General.   INDICATIONS: A 45 y.o. with 1. Morbid Obesity with Body mass index is 57.50 kg/m². and inability to lose and maintain weight loss.  2. Co-morbidities: insulin resistance, BARNHART, vit d deficiency and hpylori under treatment  DESCRIPTION OF PROCEDURE: The patient was placed under general anesthesia. The   abdomen was prepped and draped in usual manner. Access to peritoneum was   gained 18 cm below the xyphoid using Optiview trocar under direct vision.   Pneumoperitoneum to 15 mmHg with CO2 gas was obtained. Four 5 mm trocars were   placed medially, subcostally at the midclavicular and anterior axial lines   bilaterally. A 10 mm trocar was placed 1 handbreadth caudad to the right   midclavicular trocar. The liver retractor was placed. The greater curve was   taken down starting 6 cm from the pylorus going all the way to the base of the   left mar taking all posterior gastric attachments with the Harmonic scalpel. A   42-Polish bougie was passed towards the pylorus and the stomach was resected  along the bougie starting 6 cm from the pylorus and coming out just a   little bit at the angle of His. The staple line was oversewn with a running   Quill stitch. The bougie was removed. Endoscopy was   performed. The sleeve appeared appropriate size and configuration and there   were no air leaks seen. The air was aspirated from the endoscope and the   endoscope was withdrawn. Lauren was placed over the surgical areas.  Pneumoperitoneum was released for one minute and re-inflated to check for areas of bleeding which were  controlled. The liver retractor was removed. The gastrectomy was   removed through the primary trochar site. That incision was then closed with 0 Vicryl   transfascially. The trocars removed under direct vision. Prior to   removing the last trocar, the pneumoperitoneum was allowed to escape. The skin   incisions were infiltrated with Marcaine solution, closed with 4-0 plain catgut,   and reinforced with Mastisol, Steri-Strips, and Band-Aids. The patient   tolerated procedure well and was brought to Recovery Room in stable condition.   Sponge and needle counts were correct at the end of the case.    Blood loss was min, complications are none, consent was obtained and pathology was gastrectomy.

## 2020-12-02 NOTE — PLAN OF CARE
Vital signs stable. Afebrile. Alert, oriented and following commands. Pain and nausea controlled with PRN meds. IVF infusing per MD. Pt tolerated hycet. Lap sites remain CDI. POC reviewed with pt and understanding verbalized.

## 2020-12-02 NOTE — BRIEF OP NOTE
Operative Note       Surgery Date: 12/2/2020     Surgeon(s) and Role:     * Papa Arrington MD - Primary     * Sacha Mccurdy MD - Resident - Assisting    Pre-op Diagnosis:  Morbid obesity [E66.01]  BMI 50.0-59.9, adult [Z68.43]    Post-op Diagnosis:  Morbid obesity [E66.01]  BMI 50.0-59.9, adult [Z68.43]    Procedure(s) (LRB):  GASTRECTOMY, SLEEVE, LAPAROSCOPIC, with intraop EGD (N/A)    Anesthesia: General    Procedure in Detail/Findings:  Sleeve without apparent complication.  No hiatal hernia.    Estimated Blood Loss: Minimal           Specimens (From admission, onward)     Start     Ordered    12/02/20 1120  Specimen to Pathology, Surgery General Surgery  Once     Question:  Procedure Type:  Answer:  General Surgery    12/02/20 1148              Implants: * No implants in log *           Disposition: PACU - hemodynamically stable.           Condition: Good    Attestation:  I was present and scrubbed for the entire procedure.

## 2020-12-03 VITALS
WEIGHT: 315 LBS | HEART RATE: 74 BPM | OXYGEN SATURATION: 95 % | HEIGHT: 72 IN | RESPIRATION RATE: 20 BRPM | SYSTOLIC BLOOD PRESSURE: 150 MMHG | BODY MASS INDEX: 42.66 KG/M2 | DIASTOLIC BLOOD PRESSURE: 82 MMHG | TEMPERATURE: 98 F

## 2020-12-03 LAB
ANION GAP SERPL CALC-SCNC: 10 MMOL/L (ref 8–16)
BASOPHILS # BLD AUTO: 0.02 K/UL (ref 0–0.2)
BASOPHILS # BLD AUTO: 0.03 K/UL (ref 0–0.2)
BASOPHILS NFR BLD: 0.3 % (ref 0–1.9)
BASOPHILS NFR BLD: 0.4 % (ref 0–1.9)
BUN SERPL-MCNC: 5 MG/DL (ref 6–20)
CALCIUM SERPL-MCNC: 8.6 MG/DL (ref 8.7–10.5)
CHLORIDE SERPL-SCNC: 102 MMOL/L (ref 95–110)
CO2 SERPL-SCNC: 26 MMOL/L (ref 23–29)
CREAT SERPL-MCNC: 0.9 MG/DL (ref 0.5–1.4)
DIFFERENTIAL METHOD: ABNORMAL
DIFFERENTIAL METHOD: ABNORMAL
EOSINOPHIL # BLD AUTO: 0.1 K/UL (ref 0–0.5)
EOSINOPHIL # BLD AUTO: 0.1 K/UL (ref 0–0.5)
EOSINOPHIL NFR BLD: 0.6 % (ref 0–8)
EOSINOPHIL NFR BLD: 1.2 % (ref 0–8)
ERYTHROCYTE [DISTWIDTH] IN BLOOD BY AUTOMATED COUNT: 12.9 % (ref 11.5–14.5)
ERYTHROCYTE [DISTWIDTH] IN BLOOD BY AUTOMATED COUNT: 13.1 % (ref 11.5–14.5)
EST. GFR  (AFRICAN AMERICAN): >60 ML/MIN/1.73 M^2
EST. GFR  (NON AFRICAN AMERICAN): >60 ML/MIN/1.73 M^2
GLUCOSE SERPL-MCNC: 113 MG/DL (ref 70–110)
HCT VFR BLD AUTO: 38 % (ref 40–54)
HCT VFR BLD AUTO: 38.8 % (ref 40–54)
HGB BLD-MCNC: 12.4 G/DL (ref 14–18)
HGB BLD-MCNC: 12.5 G/DL (ref 14–18)
IMM GRANULOCYTES # BLD AUTO: 0.01 K/UL (ref 0–0.04)
IMM GRANULOCYTES # BLD AUTO: 0.03 K/UL (ref 0–0.04)
IMM GRANULOCYTES NFR BLD AUTO: 0.1 % (ref 0–0.5)
IMM GRANULOCYTES NFR BLD AUTO: 0.4 % (ref 0–0.5)
LYMPHOCYTES # BLD AUTO: 1.2 K/UL (ref 1–4.8)
LYMPHOCYTES # BLD AUTO: 1.3 K/UL (ref 1–4.8)
LYMPHOCYTES NFR BLD: 15.4 % (ref 18–48)
LYMPHOCYTES NFR BLD: 17.7 % (ref 18–48)
MAGNESIUM SERPL-MCNC: 1.8 MG/DL (ref 1.6–2.6)
MCH RBC QN AUTO: 28.8 PG (ref 27–31)
MCH RBC QN AUTO: 28.9 PG (ref 27–31)
MCHC RBC AUTO-ENTMCNC: 32 G/DL (ref 32–36)
MCHC RBC AUTO-ENTMCNC: 32.9 G/DL (ref 32–36)
MCV RBC AUTO: 88 FL (ref 82–98)
MCV RBC AUTO: 90 FL (ref 82–98)
MONOCYTES # BLD AUTO: 1 K/UL (ref 0.3–1)
MONOCYTES # BLD AUTO: 1 K/UL (ref 0.3–1)
MONOCYTES NFR BLD: 12 % (ref 4–15)
MONOCYTES NFR BLD: 13.1 % (ref 4–15)
NEUTROPHILS # BLD AUTO: 4.9 K/UL (ref 1.8–7.7)
NEUTROPHILS # BLD AUTO: 5.7 K/UL (ref 1.8–7.7)
NEUTROPHILS NFR BLD: 67.5 % (ref 38–73)
NEUTROPHILS NFR BLD: 71.3 % (ref 38–73)
NRBC BLD-RTO: 0 /100 WBC
NRBC BLD-RTO: 0 /100 WBC
PHOSPHATE SERPL-MCNC: 3 MG/DL (ref 2.7–4.5)
PLATELET # BLD AUTO: 233 K/UL (ref 150–350)
PLATELET # BLD AUTO: 265 K/UL (ref 150–350)
PMV BLD AUTO: 9 FL (ref 9.2–12.9)
PMV BLD AUTO: 9.6 FL (ref 9.2–12.9)
POTASSIUM SERPL-SCNC: 3.8 MMOL/L (ref 3.5–5.1)
RBC # BLD AUTO: 4.31 M/UL (ref 4.6–6.2)
RBC # BLD AUTO: 4.32 M/UL (ref 4.6–6.2)
SODIUM SERPL-SCNC: 138 MMOL/L (ref 136–145)
WBC # BLD AUTO: 7.24 K/UL (ref 3.9–12.7)
WBC # BLD AUTO: 7.93 K/UL (ref 3.9–12.7)

## 2020-12-03 PROCEDURE — 25000003 PHARM REV CODE 250: Performed by: STUDENT IN AN ORGANIZED HEALTH CARE EDUCATION/TRAINING PROGRAM

## 2020-12-03 PROCEDURE — 83735 ASSAY OF MAGNESIUM: CPT

## 2020-12-03 PROCEDURE — 63600175 PHARM REV CODE 636 W HCPCS: Performed by: STUDENT IN AN ORGANIZED HEALTH CARE EDUCATION/TRAINING PROGRAM

## 2020-12-03 PROCEDURE — 84100 ASSAY OF PHOSPHORUS: CPT

## 2020-12-03 PROCEDURE — 80048 BASIC METABOLIC PNL TOTAL CA: CPT

## 2020-12-03 PROCEDURE — 27000221 HC OXYGEN, UP TO 24 HOURS

## 2020-12-03 PROCEDURE — C9113 INJ PANTOPRAZOLE SODIUM, VIA: HCPCS | Performed by: STUDENT IN AN ORGANIZED HEALTH CARE EDUCATION/TRAINING PROGRAM

## 2020-12-03 PROCEDURE — 36415 COLL VENOUS BLD VENIPUNCTURE: CPT

## 2020-12-03 PROCEDURE — 85025 COMPLETE CBC W/AUTO DIFF WBC: CPT

## 2020-12-03 PROCEDURE — 94761 N-INVAS EAR/PLS OXIMETRY MLT: CPT

## 2020-12-03 RX ADMIN — PANTOPRAZOLE SODIUM 40 MG: 40 INJECTION, POWDER, FOR SOLUTION INTRAVENOUS at 08:12

## 2020-12-03 RX ADMIN — ACETAMINOPHEN 499.51 MG: 160 SOLUTION ORAL at 05:12

## 2020-12-03 RX ADMIN — GABAPENTIN 300 MG: 250 SOLUTION ORAL at 08:12

## 2020-12-03 RX ADMIN — HYDROCODONE BITARTRATE AND ACETAMINOPHEN 15 ML: 7.5; 325 SOLUTION ORAL at 05:12

## 2020-12-03 RX ADMIN — ENOXAPARIN SODIUM 40 MG: 40 INJECTION SUBCUTANEOUS at 08:12

## 2020-12-03 NOTE — PLAN OF CARE
Problem: Adult Inpatient Plan of Care  Goal: Plan of Care Review  Outcome: Ongoing, Progressing  Goal: Patient-Specific Goal (Individualization)  Outcome: Ongoing, Progressing  Goal: Absence of Hospital-Acquired Illness or Injury  Outcome: Ongoing, Progressing  Intervention: Identify and Manage Fall Risk  Flowsheets (Taken 12/3/2020 0353)  Safety Promotion/Fall Prevention:   assistive device/personal item within reach   instructed to call staff for mobility   side rails raised x 2   nonskid shoes/socks when out of bed  Intervention: Prevent VTE (venous thromboembolism)  Flowsheets (Taken 12/3/2020 0353)  VTE Prevention/Management: remove, assess skin and reapply sequential compression device  Goal: Optimal Comfort and Wellbeing  Outcome: Ongoing, Progressing  Intervention: Provide Person-Centered Care  Flowsheets (Taken 12/3/2020 0353)  Trust Relationship/Rapport: care explained  Goal: Readiness for Transition of Care  Outcome: Ongoing, Progressing  Goal: Rounds/Family Conference  Outcome: Ongoing, Progressing     Problem: Bariatric Environmental Safety  Goal: Safety Maintained with Care  Outcome: Ongoing, Progressing     Problem: Bariatric Care Environmental Safety (Bariatric Surgery)  Goal: Safety Maintained with Care  Outcome: Ongoing, Progressing     Problem: Bleeding (Bariatric Surgery)  Goal: Absence of Bleeding  Outcome: Ongoing, Progressing     Problem: Bowel Motility Impaired (Bariatric Surgery)  Goal: Effective Bowel Motility and Elimination  Outcome: Ongoing, Progressing  Intervention: Enhance Bowel Motility and Elimination  Flowsheets (Taken 12/3/2020 0353)  Bowel Motility Enhancement: ambulation promoted     Problem: Glycemic Control Impaired (Bariatric Surgery)  Goal: Blood Glucose Level Within Desired Range  Outcome: Ongoing, Progressing     Problem: Infection (Bariatric Surgery)  Goal: Absence of Infection Signs/Symptoms  Outcome: Ongoing, Progressing  Intervention: Prevent or Manage  Infection  Flowsheets (Taken 12/3/2020 0353)  Fever Reduction/Comfort Measures: medication administered  Infection Management: aseptic technique maintained     Problem: Ongoing Anesthesia Effects (Bariatric Surgery)  Goal: Anesthesia/Sedation Recovery  Outcome: Ongoing, Progressing  Intervention: Optimize Anesthesia Recovery  Flowsheets (Taken 12/3/2020 0353)  Safety Promotion/Fall Prevention:   assistive device/personal item within reach   instructed to call staff for mobility   side rails raised x 2   nonskid shoes/socks when out of bed     Problem: Pain (Bariatric Surgery)  Goal: Acceptable Pain Control  Outcome: Ongoing, Progressing     Problem: Postoperative Nausea and Vomiting (Bariatric Surgery)  Goal: Nausea and Vomiting Relief  Outcome: Ongoing, Progressing     Problem: Postoperative Urinary Retention (Bariatric Surgery)  Goal: Effective Urinary Elimination  Outcome: Ongoing, Progressing     Problem: Post-Bariatric Surgery Care  Goal: Bariatric Home Regimen Maintained  Outcome: Ongoing, Progressing     Problem: Fall Injury Risk  Goal: Absence of Fall and Fall-Related Injury  Outcome: Ongoing, Progressing  Intervention: Identify and Manage Contributors to Fall Injury Risk  Flowsheets (Taken 12/3/2020 0353)  Self-Care Promotion:   independence encouraged   BADL personal objects within reach  Medication Review/Management: medications reviewed  Intervention: Promote Injury-Free Environment  Flowsheets (Taken 12/3/2020 0353)  Safety Promotion/Fall Prevention:   assistive device/personal item within reach   instructed to call staff for mobility   side rails raised x 2   nonskid shoes/socks when out of bed  Environmental Safety Modification:   assistive device/personal items within reach   clutter free environment maintained   lighting adjusted

## 2020-12-03 NOTE — PLAN OF CARE
CM to bedside - pt and spouse present; pt provided assessment info. Pt w/ no DME in place, lives w/ spouse and son. Pt will likely d/c home w/ no needs. Pt lives in a 1 story home w/ no steps to enter. Pt reports having 1 pet - CM encouraged pt to exercise caution w/ pets d/t potential fall/tripping risk.    CM provided patient anticipated ASHLEY which was written on the whiteboard and will be update by nursing staff.   Patient provided a Discharge Planning booklet. Patient verbalized understanding.    MATTHEW ROSA [id number] - assisting 5th floor MATTHEW Gonzalez [id number]     12/03/20 1257   Discharge Assessment   Assessment Type Discharge Planning Assessment   Confirmed/corrected address and phone number on facesheet? Yes   Assessment information obtained from? Patient;Caregiver   Expected Length of Stay (days) 1   Communicated expected length of stay with patient/caregiver yes   Prior to hospitilization cognitive status: Alert/Oriented   Prior to hospitalization functional status: Independent   Current cognitive status: Alert/Oriented   Current Functional Status: Independent   Facility Arrived From: N/A   Lives With spouse;child(lucía), adult   Able to Return to Prior Arrangements yes   Is patient able to care for self after discharge? Yes   Who are your caregiver(s) and their phone number(s)? spouse - Jin [phone]   Patient's perception of discharge disposition home or selfcare   Readmission Within the Last 30 Days no previous admission in last 30 days   Patient currently being followed by outpatient case management? No   Patient currently receives any other outside agency services? No   Equipment Currently Used at Home none   Do you have any problems affording any of your prescribed medications? No   Is the patient taking medications as prescribed? yes   Does the patient have transportation home? Yes  (pt's spouse will pickup pt at d/c)   Transportation Anticipated car, drives self;family or friend will provide   Does the  patient receive services at the [facility]? No   Discharge Plan A Home with family   Discharge Plan B Home with family;Home Health   DME Needed Upon Discharge  other (see comments)  (TBD)   Patient/Family in Agreement with Plan yes

## 2020-12-03 NOTE — SUBJECTIVE & OBJECTIVE
Interval History: No acute events.     Medications:  Continuous Infusions:   lactated ringers 125 mL/hr at 12/02/20 1300     Scheduled Meds:   acetaminophen  499.5074 mg Oral Q8H    enoxparin  40 mg Subcutaneous Q12H    gabapentin  300 mg Oral TID    pantoprazole  40 mg Intravenous BID     PRN Meds:hydrocodone-apap 7.5-325 MG/15 ML, HYDROmorphone, ondansetron, promethazine (PHENERGAN) IVPB, simethicone     Review of patient's allergies indicates:   Allergen Reactions    Shrimp Hives and Shortness Of Breath     Objective:     Vital Signs (Most Recent):  Temp: 97.4 °F (36.3 °C) (12/03/20 0451)  Pulse: 74 (12/03/20 0532)  Resp: 20 (12/03/20 0532)  BP: (!) 155/85 (12/03/20 0532)  SpO2: 97 % (12/03/20 0532) Vital Signs (24h Range):  Temp:  [97.4 °F (36.3 °C)-98.4 °F (36.9 °C)] 97.4 °F (36.3 °C)  Pulse:  [65-76] 74  Resp:  [14-22] 20  SpO2:  [91 %-100 %] 97 %  BP: (136-199)/(68-96) 155/85     Weight: (!) 182.8 kg (403 lb)  Body mass index is 54.66 kg/m².    Intake/Output - Last 3 Shifts       12/01 0700 - 12/02 0659 12/02 0700 - 12/03 0659 12/03 0700 - 12/04 0659    I.V. (mL/kg)  1000 (5.5)     Total Intake(mL/kg)  1000 (5.5)     Net  +1000                Physical Exam  Nursing note reviewed.   Constitutional:       General: He is not in acute distress.     Appearance: He is well-developed.   HENT:      Head: Normocephalic and atraumatic.   Cardiovascular:      Rate and Rhythm: Normal rate.   Pulmonary:      Effort: Pulmonary effort is normal. No respiratory distress.      Breath sounds: No stridor.   Abdominal:      General: There is no distension.      Palpations: Abdomen is soft.      Tenderness: There is abdominal tenderness (minimal, incisional- cdi lap sites with dermabond).   Skin:     General: Skin is warm and dry.   Neurological:      Mental Status: He is alert and oriented to person, place, and time.       Significant Labs:  CBC:   Recent Labs   Lab 12/03/20  0418   WBC 7.93   RBC 4.32*   HGB 12.5*   HCT  38.0*      MCV 88   MCH 28.9   MCHC 32.9     CMP:   Recent Labs   Lab 12/03/20  0417   *   CALCIUM 8.6*      K 3.8   CO2 26      BUN 5*   CREATININE 0.9     Significant Diagnostics:  I have reviewed and interpreted all pertinent imaging results/findings within the past 24 hours.

## 2020-12-03 NOTE — PLAN OF CARE
12/03/20 1256   Post-Acute Status   Post-Acute Authorization Other   Other Status No Post-Acute Service Needs   Discharge Delays None known at this time   Discharge Plan   Discharge Plan A Home with family   Discharge Plan B Home with family;Home Health

## 2020-12-03 NOTE — HPI
Roshan Zaidi is a 45 y.o. male who presents for pre-operative exam for weight loss surgery.  he has completed him pre-operative evaluation.  he has failed medical treatment for obesity.  1. Morbid Obesity with Body mass index is 57.50 kg/m². and inability to lose and maintain weight loss.  2. Co-morbidities: insulin resistance, BARNHART, vit d deficiency and hpylori under treatment

## 2020-12-03 NOTE — DISCHARGE SUMMARY
[facility]  General Surgery  Discharge Summary      Patient Name: Roshan Zaidi III  MRN: [id number]  Admission Date: 12/2/2020  Hospital Length of Stay: 1 days  Discharge Date and Time:  12/03/2020 12:54 PM  Attending Physician: Papa Arrington MD   Discharging Provider: Nicole Page MD  Primary Care Provider: Jose Holm MD    HPI:   Roshan Zaidi is a 45 y.o. male who presents for pre-operative exam for weight loss surgery.  he has completed him pre-operative evaluation.  he has failed medical treatment for obesity.  1. Morbid Obesity with Body mass index is 57.50 kg/m². and inability to lose and maintain weight loss.  2. Co-morbidities: insulin resistance, BARNHART, vit d deficiency and hpylori under treatment       Procedure(s) (LRB):  GASTRECTOMY, SLEEVE, LAPAROSCOPIC, with intraop EGD (N/A)      Indwelling Lines/Drains at time of discharge:   Lines/Drains/Airways     None               Hospital Course:  45 y.o. male admitted for planned gastric sleeve. He tolerated the procedure well. He ambulated. His pain was well controlled. He tolerated clears. Repeat CBC with stable hgb. He was discharged to home.     Consults: none    Significant Diagnostic Studies: none    Pending Diagnostic Studies:     Procedure Component Value Units Date/Time    Specimen to Pathology, Surgery General Surgery [[id number]] Collected: 12/02/20 1148    Order Status: Sent Lab Status: In process Updated: 12/02/20 1416        Final Active Diagnoses:    Diagnosis Date Noted POA    PRINCIPAL PROBLEM:  Obesity [E66.9] 12/02/2020 Yes    Morbid obesity [E66.01] 11/15/2019 Yes      Problems Resolved During this Admission:      Discharged Condition: good    Disposition: Home or Self Care    Follow Up:  Follow-up Information     Papa Arrington MD In 2 weeks.    Specialties: General Surgery, Bariatrics  Contact information:  [place] [address] [place]  [facility] [address] [phone]                 Patient  Instructions:   No driving while still taking pain medications daily.   Take a stool softener while taking pain medications daily.   No lifting more than 10 lbs for 6 weeks.   Crush all meds  Ok to shower in 48 hours  Follow diet as outlined in book    Medications:  Reconciled Home Medications:      Medication List      ASK your doctor about these medications    ergocalciferol 50,000 unit Cap  Commonly known as: ERGOCALCIFEROL  Take 1 capsule (50,000 Units total) by mouth every 7 days. for 12 doses     hydrocodone-apap 7.5-325 MG/15 ML oral solution  Commonly known as: HYCET  Take 15 mLs by mouth 4 (four) times daily as needed for Pain.     omeprazole 40 MG capsule  Commonly known as: PRILOSEC  Take 1 capsule (40 mg total) by mouth every morning. Open capsule and take with apple sauce     ondansetron 8 MG Tbdl  Commonly known as: ZOFRAN-ODT  Dissolve 1 tablet (8 mg total) by mouth every 6 (six) hours as needed.     polyethylene glycol 17 gram/dose powder  Commonly known as: GLYCOLAX  Mix 1 capful (17 g) with liquid and take by mouth once daily.     sildenafiL 100 MG tablet  Commonly known as: VIAGRA  Take 1 tablet (100 mg total) by mouth daily as needed for Erectile Dysfunction.     ursodioL 500 MG tablet  Commonly known as: ERLIN FORTE  Crush one tablet daily for gall bladder.          Time spent on the discharge of patient: 5 minutes    Nicole Page MD  General Surgery  [facility]

## 2020-12-03 NOTE — PLAN OF CARE
Pt will discharge to home.    Jose Holm MD  Call  As needed  [address]  [address]  [place] [address]  [phone]   [facility] B[facility]  On 12/9/2020  Post op/Virtual Visit Wednesday 12/9 @ 8:30am w/ Mariah Oro  [address]  [facility] [address]  [phone]      12/03/20 1305   Final Note   Assessment Type Final Discharge Note   Anticipated Discharge Disposition Home   Hospital Follow Up  Appt(s) scheduled? Yes   Right Care Referral Info   Post Acute Recommendation No Care   Post-Acute Status   Post-Acute Authorization Other   Other Status No Post-Acute Service Needs   Discharge Delays None known at this time

## 2020-12-03 NOTE — PLAN OF CARE
12/03/20 1412   Final Note   Assessment Type Final Discharge Note   Anticipated Discharge Disposition Home   What phone number can be called within the next 1-3 days to see how you are doing after discharge? [phone]   Hospital Follow Up  Appt(s) scheduled? Yes   Discharge plans and expectations educations in teach back method with documentation complete? Yes     Future Appointments   Date Time Provider Department Center   12/9/2020  8:30 AM Mariah Berman roxy   12/16/2020 10:40 AM LAB, APPOINTMENT [place] [facility]   12/16/2020 11:10 AM ANAI Thomas roxy   12/16/2020 12:30 PM Mariah Butts   1/27/2021  9:30 AM LAB, APPOINTMENT [place] [facility]   1/27/2021 10:00 AM ANAI Thomas   1/27/2021 10:30 AM Mariah Berman roxy

## 2020-12-03 NOTE — ASSESSMENT & PLAN NOTE
45 y.o. male POD#1 s/p lap sleeve gastrectomy    -doing well  -recheck CBC at 1100 (baseline 14, 12 this AM)  -pathway  -SLIV  -WP, CLD  -Hycet prn  - zofran/ phenergan prn  -replace lytes prn  -ambulate  -encourage IS  -d/c to home today when dany clears, ambulates, pain controlled

## 2020-12-03 NOTE — PROGRESS NOTES
[facility]  General Surgery  Progress Note    Subjective:     History of Present Illness:  No notes on file    Post-Op Info:  Procedure(s) (LRB):  GASTRECTOMY, SLEEVE, LAPAROSCOPIC, with intraop EGD (N/A)   1 Day Post-Op     Interval History: No acute events.     Medications:  Continuous Infusions:   lactated ringers 125 mL/hr at 12/02/20 1300     Scheduled Meds:   acetaminophen  499.5074 mg Oral Q8H    enoxparin  40 mg Subcutaneous Q12H    gabapentin  300 mg Oral TID    pantoprazole  40 mg Intravenous BID     PRN Meds:hydrocodone-apap 7.5-325 MG/15 ML, HYDROmorphone, ondansetron, promethazine (PHENERGAN) IVPB, simethicone     Review of patient's allergies indicates:   Allergen Reactions    Shrimp Hives and Shortness Of Breath     Objective:     Vital Signs (Most Recent):  Temp: 97.4 °F (36.3 °C) (12/03/20 0451)  Pulse: 74 (12/03/20 0532)  Resp: 20 (12/03/20 0532)  BP: (!) 155/85 (12/03/20 0532)  SpO2: 97 % (12/03/20 0532) Vital Signs (24h Range):  Temp:  [97.4 °F (36.3 °C)-98.4 °F (36.9 °C)] 97.4 °F (36.3 °C)  Pulse:  [65-76] 74  Resp:  [14-22] 20  SpO2:  [91 %-100 %] 97 %  BP: (136-199)/(68-96) 155/85     Weight: (!) 182.8 kg (403 lb)  Body mass index is 54.66 kg/m².    Intake/Output - Last 3 Shifts       12/01 0700 - 12/02 0659 12/02 0700 - 12/03 0659 12/03 0700 - 12/04 0659    I.V. (mL/kg)  1000 (5.5)     Total Intake(mL/kg)  1000 (5.5)     Net  +1000                Physical Exam  Nursing note reviewed.   Constitutional:       General: He is not in acute distress.     Appearance: He is well-developed.   HENT:      Head: Normocephalic and atraumatic.   Cardiovascular:      Rate and Rhythm: Normal rate.   Pulmonary:      Effort: Pulmonary effort is normal. No respiratory distress.      Breath sounds: No stridor.   Abdominal:      General: There is no distension.      Palpations: Abdomen is soft.      Tenderness: There is abdominal tenderness (minimal, incisional- cdi lap sites with  dermabond).   Skin:     General: Skin is warm and dry.   Neurological:      Mental Status: He is alert and oriented to person, place, and time.       Significant Labs:  CBC:   Recent Labs   Lab 12/03/20  0418   WBC 7.93   RBC 4.32*   HGB 12.5*   HCT 38.0*      MCV 88   MCH 28.9   MCHC 32.9     CMP:   Recent Labs   Lab 12/03/20 0417   *   CALCIUM 8.6*      K 3.8   CO2 26      BUN 5*   CREATININE 0.9     Significant Diagnostics:  I have reviewed and interpreted all pertinent imaging results/findings within the past 24 hours.    Assessment/Plan:     * Obesity  45 y.o. male POD#1 s/p lap sleeve gastrectomy    -doing well  -recheck CBC at 1100 (baseline 14, 12 this AM)  -pathway  -SLIV  -WP, CLD  -Hycet prn  - zofran/ phenergan prn  -replace lytes prn  -ambulate  -encourage IS  -d/c to home today when dany clears, ambulates, pain controlled          Nicole Page MD  General Surgery  [facility]

## 2020-12-03 NOTE — PLAN OF CARE
Plan of care reviewed with pt. Pt AAOx4, VS as charted. Patient on 2L NC. Purposeful rounding for pt care and safety. NV and pain managed with use of PRN meds. No falls or injuries reported during this shift. Skin integrity as charted. Safety precautions maintained during shift- bed in low position, call light in reach, SR up x2, personal belongings in reach.

## 2020-12-04 ENCOUNTER — PATIENT OUTREACH (OUTPATIENT)
Dept: ADMINISTRATIVE | Facility: CLINIC | Age: 45
End: 2020-12-04

## 2020-12-04 NOTE — PATIENT INSTRUCTIONS
Discharge Instructions for Gastrectomy  You had a gastrectomy. During this surgery, some or all of your stomach was removed. As you heal from surgery, heres what youll need to know to care for yourself.  Eating and drinking  · Follow the diet that was prescribed for you in the hospital. Eat pureed foods and liquids for 3 weeks after the surgery.  · Drink liquids in smaller amounts than you used to. This will make it easier for your body to digest liquids. But, it is important that you continue to drink liquids (in small amounts) so that you do not become dehydrated. Some signs of dehydration include dry mouth and dark urine.  · Eat slowly. Eating too much or too fast will cause nausea and vomiting.  · Liquids and solids may need to be eaten separately.  · Use liquid nutritional supplements recommended by a health care provider to make sure you get enough calories.  · Try to eat small, frequent high protein low carbohydrate meals when you are eating solids again.  Activity  · Remember, recovery takes several weeks. It is common to feel tired. Rest as needed.  · Walk as often as you feel able. Increase your activity slowly.  · Do not lift anything heavier than 10 pounds until the healthcare provider says it's OK.  · Avoid strenuous chores, such as vacuuming or lifting full bags of garbage, until the healthcare provider says its OK.  · Climb stairs slowly and pause after every few steps.  · Do not drive for 2 weeks after surgery.  · Start an exercise program 1 week after discharge. You can benefit from simple activities such as walking or gardening. Ask your healthcare provider how to get started.  · Ask your healthcare provider when you can expect to return to work.  Other home care  · Continue the coughing and deep breathing exercises that you learned in the hospital.  · Shower as needed. But avoid baths, swimming pools, and hot tubs until your healthcare provider says they are OK. This helps prevent infection  of the incision site.  · Keep the incision clean and dry. Wash the incision gently with mild soap and warm water. Then gently pat the incision dry with a towel.  · Follow your healthcare providers instructions about caring for the dressing covering your incisions.  · If your healthcare provider used small white adhesive strips to close the incision, do not remove them. Let the strips fall off on their own. If they dont come off within 2 weeks after you were sent home, call your healthcare provider.  · Take your medicines in crushed or liquid form for 3 weeks after surgery.  · Take a chewable vitamin 2 times a day. Ask your healthcare provider if you also need to take a supplement for vitamin B12.  · Take all medicines as directed by your healthcare provider.  Follow-up care  Follow up with your healthcare provider, or as advised.     When to call your healthcare provider  Call your healthcare provider right away if you have any of the following:  · Cloudy or smelly drainage from the incision site  · Fever of 100.4°F (38°C) or higher, or as directed by your healthcare provider  · Shaking chills  · Fast pulse  · Night sweats  · Pain, nausea, or vomiting that keeps occurring after you eat  · Diarrhea beyond the first week after discharge  · Pain in your upper back, chest, or left shoulder  · Hiccups that wont stop or that keep coming back  · Confusion, depression, or unusual fatigue  · Signs of bladder infection. These include urinating more often than usual, and burning, pain, bleeding, or hesitancy when you urinate.   Date Last Reviewed: 10/1/2016  © 9688-6987 [employer]. [address] [place] [address], [place] [address]. All rights reserved. This information is not intended as a substitute for professional medical care. Always follow your healthcare professional's instructions.

## 2020-12-09 ENCOUNTER — CLINICAL SUPPORT (OUTPATIENT)
Dept: BARIATRICS | Facility: CLINIC | Age: 45
End: 2020-12-09
Payer: COMMERCIAL

## 2020-12-09 LAB
FINAL PATHOLOGIC DIAGNOSIS: NORMAL
Lab: NORMAL

## 2020-12-09 PROCEDURE — 99499 NO LOS: ICD-10-PCS | Mod: 95,,, | Performed by: DIETITIAN, REGISTERED

## 2020-12-09 PROCEDURE — 99499 UNLISTED E&M SERVICE: CPT | Mod: 95,,, | Performed by: DIETITIAN, REGISTERED

## 2020-12-09 NOTE — PROGRESS NOTES
The patient location is: home ([place])  The chief complaint leading to consultation is: s/p bariatric surgery    Visit type: audiovisual    Face to Face time with patient: 30 min  30 minutes of total time spent on the encounter, which includes face to face time and non-face to face time preparing to see the patient (eg, review of tests), Obtaining and/or reviewing separately obtained history, Documenting clinical information in the electronic or other health record, Independently interpreting results (not separately reported) and communicating results to the patient/family/caregiver, or Care coordination (not separately reported).         Each patient to whom he or she provides medical services by telemedicine is:  (1) informed of the relationship between the physician and patient and the respective role of any other health care provider with respect to management of the patient; and (2) notified that he or she may decline to receive medical services by telemedicine and may withdraw from such care at any time.    NUTRITION NOTE  1 week post op from bariatric surgery.    Dehydration assessment:  Urine output/color: darker yellow  Chest pain:N  Persistent increased heart rate:N  Fatigue:N  N/V: N  Dizzy/weak: N  BM: y  Protein and fluid intake assessment: (food diary)  Fluid intake: 32+floz water and Propell   Protein supplements: Started rtd prot shake yesterday, drank 1/2 OR Protein 2.0.  Protein intake yesterday: 15g  Vitamins  -What vitamins are you taking? Mv chew, Ca chews, b12 sub  -Are you tolerating well? y  Medications  Omeprazole: y  Hycet: n  How are you tolerating pain at this time? 1 (rate on a scale from 1 to 10; >7 notify PA/MD)  Are you having any problems? n (f/u with PCP, cardiologist, endocrinologist)  How is your support system at home? good  Exercise reminder (light exercise at this time, no lifting above 10 lbs). Walking 30 min daily.     Questions for nurse/MA/PA:      Assessment:  Doing well.      Discussion:  Continue to work on fluid and protein intake.  Drink 2oz fluids every 15 min. Keep in fridge to keep cold.     Confirmed date and time for 2 week po labs and clinic visit

## 2020-12-16 ENCOUNTER — OFFICE VISIT (OUTPATIENT)
Dept: BARIATRICS | Facility: CLINIC | Age: 45
End: 2020-12-16
Payer: COMMERCIAL

## 2020-12-16 ENCOUNTER — CLINICAL SUPPORT (OUTPATIENT)
Dept: BARIATRICS | Facility: CLINIC | Age: 45
End: 2020-12-16
Payer: COMMERCIAL

## 2020-12-16 ENCOUNTER — LAB VISIT (OUTPATIENT)
Dept: LAB | Facility: HOSPITAL | Age: 45
End: 2020-12-16
Attending: INTERNAL MEDICINE
Payer: COMMERCIAL

## 2020-12-16 VITALS
DIASTOLIC BLOOD PRESSURE: 70 MMHG | WEIGHT: 315 LBS | HEIGHT: 72 IN | OXYGEN SATURATION: 98 % | HEART RATE: 79 BPM | RESPIRATION RATE: 16 BRPM | SYSTOLIC BLOOD PRESSURE: 134 MMHG | BODY MASS INDEX: 42.66 KG/M2

## 2020-12-16 DIAGNOSIS — R63.4 WEIGHT LOSS: ICD-10-CM

## 2020-12-16 DIAGNOSIS — E66.01 MORBID OBESITY: ICD-10-CM

## 2020-12-16 DIAGNOSIS — Z98.890 POST-OPERATIVE STATE: Primary | ICD-10-CM

## 2020-12-16 LAB
ALBUMIN SERPL BCP-MCNC: 4 G/DL (ref 3.5–5.2)
ALP SERPL-CCNC: 47 U/L (ref 55–135)
ALT SERPL W/O P-5'-P-CCNC: 72 U/L (ref 10–44)
ANION GAP SERPL CALC-SCNC: 13 MMOL/L (ref 8–16)
AST SERPL-CCNC: 39 U/L (ref 10–40)
BASOPHILS # BLD AUTO: 0.02 K/UL (ref 0–0.2)
BASOPHILS NFR BLD: 0.4 % (ref 0–1.9)
BILIRUB SERPL-MCNC: 0.6 MG/DL (ref 0.1–1)
BUN SERPL-MCNC: 10 MG/DL (ref 6–20)
CALCIUM SERPL-MCNC: 9.5 MG/DL (ref 8.7–10.5)
CHLORIDE SERPL-SCNC: 102 MMOL/L (ref 95–110)
CO2 SERPL-SCNC: 25 MMOL/L (ref 23–29)
CREAT SERPL-MCNC: 1 MG/DL (ref 0.5–1.4)
DIFFERENTIAL METHOD: NORMAL
EOSINOPHIL # BLD AUTO: 0.2 K/UL (ref 0–0.5)
EOSINOPHIL NFR BLD: 3 % (ref 0–8)
ERYTHROCYTE [DISTWIDTH] IN BLOOD BY AUTOMATED COUNT: 13.1 % (ref 11.5–14.5)
EST. GFR  (AFRICAN AMERICAN): >60 ML/MIN/1.73 M^2
EST. GFR  (NON AFRICAN AMERICAN): >60 ML/MIN/1.73 M^2
GLUCOSE SERPL-MCNC: 93 MG/DL (ref 70–110)
HCT VFR BLD AUTO: 43.7 % (ref 40–54)
HGB BLD-MCNC: 14.1 G/DL (ref 14–18)
IMM GRANULOCYTES # BLD AUTO: 0.02 K/UL (ref 0–0.04)
IMM GRANULOCYTES NFR BLD AUTO: 0.4 % (ref 0–0.5)
LYMPHOCYTES # BLD AUTO: 1.3 K/UL (ref 1–4.8)
LYMPHOCYTES NFR BLD: 26.4 % (ref 18–48)
MCH RBC QN AUTO: 28.7 PG (ref 27–31)
MCHC RBC AUTO-ENTMCNC: 32.3 G/DL (ref 32–36)
MCV RBC AUTO: 89 FL (ref 82–98)
MONOCYTES # BLD AUTO: 0.6 K/UL (ref 0.3–1)
MONOCYTES NFR BLD: 11.2 % (ref 4–15)
NEUTROPHILS # BLD AUTO: 3 K/UL (ref 1.8–7.7)
NEUTROPHILS NFR BLD: 58.6 % (ref 38–73)
NRBC BLD-RTO: 0 /100 WBC
PLATELET # BLD AUTO: 310 K/UL (ref 150–350)
PMV BLD AUTO: 9.2 FL (ref 9.2–12.9)
POTASSIUM SERPL-SCNC: 4.1 MMOL/L (ref 3.5–5.1)
PROT SERPL-MCNC: 8.2 G/DL (ref 6–8.4)
RBC # BLD AUTO: 4.91 M/UL (ref 4.6–6.2)
SODIUM SERPL-SCNC: 140 MMOL/L (ref 136–145)
VIT B12 SERPL-MCNC: 1593 PG/ML (ref 210–950)
WBC # BLD AUTO: 5.08 K/UL (ref 3.9–12.7)

## 2020-12-16 PROCEDURE — 99499 NO LOS: ICD-10-PCS | Mod: S$GLB,,, | Performed by: DIETITIAN, REGISTERED

## 2020-12-16 PROCEDURE — 99024 PR POST-OP FOLLOW-UP VISIT: ICD-10-PCS | Mod: S$GLB,,, | Performed by: PHYSICIAN ASSISTANT

## 2020-12-16 PROCEDURE — 36415 COLL VENOUS BLD VENIPUNCTURE: CPT

## 2020-12-16 PROCEDURE — 82607 VITAMIN B-12: CPT

## 2020-12-16 PROCEDURE — 80053 COMPREHEN METABOLIC PANEL: CPT

## 2020-12-16 PROCEDURE — 99999 PR PBB SHADOW E&M-EST. PATIENT-LVL III: CPT | Mod: PBBFAC,,, | Performed by: PHYSICIAN ASSISTANT

## 2020-12-16 PROCEDURE — 99499 UNLISTED E&M SERVICE: CPT | Mod: S$GLB,,, | Performed by: DIETITIAN, REGISTERED

## 2020-12-16 PROCEDURE — 99999 PR PBB SHADOW E&M-EST. PATIENT-LVL III: ICD-10-PCS | Mod: PBBFAC,,, | Performed by: PHYSICIAN ASSISTANT

## 2020-12-16 PROCEDURE — 99024 POSTOP FOLLOW-UP VISIT: CPT | Mod: S$GLB,,, | Performed by: PHYSICIAN ASSISTANT

## 2020-12-16 PROCEDURE — 85025 COMPLETE CBC W/AUTO DIFF WBC: CPT

## 2020-12-16 PROCEDURE — 84425 ASSAY OF VITAMIN B-1: CPT

## 2020-12-16 NOTE — PATIENT INSTRUCTIONS
High Protein Pureed Diet    2 weeks after gastric bypass and sleeve you may be ready to add pureed food to your diet.  All food should be the consistency of baby food, or thinner.  Follow pureed diet for the next 2 weeks.    Protein - It is very important to pay attention to protein intake during this time.      Inadequate protein intake can cause:  ? Delayed Wound Healing  ? Hair Loss  ? Muscle Breakdown    Meal Plan - Eat 3-4 meals per day (2-4 tbsp each), with protein supplements in between to meet protein needs.  Meeting protein needs daily will help increase healing, decrease muscle loss, and increase weight loss.  Your goal is  grams of protein a day.    Protein First - Always eat the foods with the highest protein first.  Foods high in protein include milk, yogurt, cheese, egg whites, and blenderized meat, seafood, and beans.    Fluids - Keep track in your journal of how much you are drinking; you should try to drink at least 64oz of fluids every day.      Foods allowed: Portion size Protein (g)   ? Sugar-free clear liquids As desired 0   ? Skim or 1% milk ½ cup 4   ? Sugar free pudding, light yogurt, custard (use skim or 1% milk in preparation) 3 oz 2.5   ? Strained baby food meats, or home-made pureed lean meats and shrimp 1 oz 7   ? Beans (red, white, black, lima, perez, fat free refried, hummus) and lentils ¼ cup 4   ? Low-fat/fat free cheese.(cottage cheese, mozzarella string cheese, ricotta cheese, Laughing Cow, Baby Bell, cheddar, etc) ¼ cup 7-8   ? Scrambled eggs or Egg Beaters 1 or ¼ cup 6   ? Edamame or Tofu, mashed ¼ cup 5   ? Unflavored protein powder (add to 1 scoop to  98% fat free soups or SF pudding) 3 Tbsp 9   ? *PB2: peanut powder (45 calories) 2 Tbsp 5     *PB2 powdered peanut butter: 45 calories vs. 190 calories in 2 tbsp of regular peanut butter. Purchase online at [employer], or  at various [employer], [employer], [employer], [employer] and [employer].      Bariatric Liquid/Pureed Sample  Menu    3-4 small meals plus 2-3 protein drinks per day.    8am 1 egg or ¼ cup Egg Beaters   9am 1 cup water, or decaf coffee or tea   10am Protein drink, 30g protein   11am 2 tbsp low-fat cottage cheese, and 1 tbsp pureed peaches   12pm 1 cup water, or sugar-free lemonade    1pm 2 tbsp pureed chicken, and 1 tbsp pureed carrots    2pm 1 cup water, or sugar-free lemonade   3pm Protein drink, 30g protein   5pm 1 cup water    6pm 1 cup hi-protein creamy chicken soup 14g protein (see Recipe below)   7pm 1 cup water, or sugar-free fruit punch    8pm 1 cup water     This sample menu provides approx. 80g protein and 64oz fluids.  Liquid protein supplements should contain 20-30g protein and less than 4 grams of sugar each.    ? Sip fluids continuously in between meals.  Drink at least ¼ cup every 15 minutes.  ? For fluids: ¼ cup = 2 oz = 4 tbsp       RECIPE IDEAS for Bariatric Pureed Diet:    Hi-Protein Creamy Chicken Soup: (10g protein per 1 cup serving)  Empty 1 can of 98% fat free cream of chicken soup into saucepan. Then  blend 1 scoop of unflavored protein powder with 1 can of skim milk until smooth.  Add protein milk to saucepan and heat to warm. (Note: Do NOT boil. Protein powder may clump if heated too hot).     Hi-Protein Pudding: (14g protein per ½ cup serving)  Add 2 scoops protein powder to 2 cups cold skim milk and mix well.  Stir in dry Jell-O Sugar-Free Instant Pudding mix.  Chill and Enjoy!    Tuna Mousse (12g protein per ¼ cup serving) Page 135 in book Eating Well After Weight Loss Surgery.  In a  or , combine all ingredients and pulse until smooth.  2 6-ounce cans tuna packed in water, drained  2 tbsp low-fat mayonnaise  2 tbsp fat-free sour cream  2 tbsp fat-free cream cheese, softened  ½ cup shallots, finely chopped  1 tbsp lemon juice  ¼ tsp ground pepper  ½ tsp celery seed    Chocolate Peanut Butter Mousse  (28g protein total)  6oz plain Greek yogurt  4 tbsp chocolate PB2

## 2020-12-16 NOTE — PROGRESS NOTES
BARIATRIC POST-OPERATIVE VISIT:    HPI:  Roshan Zaidi III is a 45 y.o. year old male presents for 2 week post op visit following s/p gastric sleeve  he is doing well and tolerating the diet without difficulty.  he has no complaints.    Pt states that he is doing well. States that he forgets to get some protein in because he is not hungry.     Also dealt with constipation prior to yesterday had a BM normal without any issues   Denies: nausea, vomiting, abdominal pain, changes in bowel movement pattern, fever, chills, dysphagia, chest pain, and shortness of breath.    Review of Systems   Constitutional: Negative for fatigue and fever.   HENT: Negative for tinnitus and trouble swallowing.    Eyes: Negative for visual disturbance.   Respiratory: Negative for cough and shortness of breath.    Cardiovascular: Negative for chest pain, palpitations and leg swelling.   Gastrointestinal: Positive for constipation. Negative for abdominal distention, abdominal pain, diarrhea, nausea and vomiting.   Genitourinary: Negative for dysuria and urgency.   Skin: Negative for rash.   Neurological: Negative for dizziness, seizures and light-headedness.   Psychiatric/Behavioral: Negative for sleep disturbance. The patient is not nervous/anxious.        EXERCISE & VITAMINS:  See Bariatric Assessment  No vitamin regimen   MEDICATIONS/ALLERGIES:  Have been reviewed.    DIET: Liquid Bariatric Diet.  1.5 protein shakes daily, ~45 grams protein.  36 fl oz SF clear beverage.  See Dietician note from today for a more detailed assessment.    Propel (16.9)  Water (16.9)     Physical Exam  Vitals signs reviewed.   Constitutional:       Appearance: He is obese.   HENT:      Head: Normocephalic and atraumatic.   Eyes:      Extraocular Movements: Extraocular movements intact.      Conjunctiva/sclera: Conjunctivae normal.      Pupils: Pupils are equal, round, and reactive to light.   Neck:      Musculoskeletal: Normal range of motion and neck  supple.   Cardiovascular:      Rate and Rhythm: Normal rate and regular rhythm.      Pulses: Normal pulses.      Heart sounds: Normal heart sounds. No murmur.   Pulmonary:      Effort: Pulmonary effort is normal.      Breath sounds: Normal breath sounds. No wheezing.   Abdominal:      General: Bowel sounds are normal.      Palpations: Abdomen is soft.      Tenderness: There is no abdominal tenderness.   Musculoskeletal: Normal range of motion.         General: No swelling.   Skin:     General: Skin is warm and dry.      Capillary Refill: Capillary refill takes less than 2 seconds.      Findings: No bruising.   Neurological:      General: No focal deficit present.      Mental Status: He is alert and oriented to person, place, and time. Mental status is at baseline.   Psychiatric:         Mood and Affect: Mood normal.         Behavior: Behavior normal.         Thought Content: Thought content normal.         Judgment: Judgment normal.         ASSESSMENT:  - Morbid obesity s/p sleeve gastrectomy   - Co-morbidities: None  - Good Weight loss, 27#'s  - No formal  Exercise routine  - Fair Diet  - No formal Vitamin regimen    PLAN:  - Increase protein   - Ursodiol 300 mg twice daily for 6 months  - Anti-Acid medication, PPi daily for 3 months  - No lifting more than 10 lbs for 6 weeks  - Miralax daily for constipation  - Emphasized the importance of regular exercise and adherence to bariatric diet to achieve maximum weight loss.  - Encouraged patient to start/continue regular exercise.  - Follow-up with dietician to advance diet.  - Continue daily vitamins and medications.  - RTC per post op   - Call the office for any issues.  - Check labs today.

## 2020-12-16 NOTE — PROGRESS NOTES
NUTRITION NOTE    Referring Physician: Papa Arrington M.D.  Reason for MNT Referral: Follow-up 2 Weeks s/p Gastric Sleeve    PAST MEDICAL HISTORY:  Denies nausea, vomiting, constipation and diarrhea.  Reports doing well.    CLINICAL DATA:  45 y.o. male.    CURRENT DIET:  Bariatric Liquid Diet    Diet Recall: 45 grams of protein/day; 32 oz of fluids/day    Diet Includes:   Protein Supplements: [employer] rtd shakes 1.5/day    EXERCISE:  Adequate light exercise. Walking around the house for 30 min daily.    Restrictions to Exercise: None.    VITAMINS/MINERALS:  Multivitamins  B-Complex  Calcium Citrate + Vitamin D  Vitamin B12: Sublingual.    ASSESSMENT:  Doing fairly well overall.  Inadequate protein intake.  Inadequate fluid intake.    BARIATRIC DIET DISCUSSION:  Instructed and provided written materials on bariatric pureed and soft diet plans.  Reinforced post-op nutrition guidelines.    PLAN/RECOMMONDATIONS:  Advance to bariatric pureed diet.  May advance to bariatric soft diet in 2 weeks as dany.  Increase protein intake.  - Aim to finish 2 protein shakes/day for 60g prot.  Increase fluid intake.  - Aim to finish 3 bottles fluids daily for goal 48 floz/day.  Continue light exercise.  Continue appropriate vitamins & minerals.    Return to clinic in 6 weeks.    SESSION TIME: 15 minutes

## 2020-12-22 LAB — VIT B1 BLD-MCNC: 48 UG/L (ref 38–122)

## 2021-01-27 ENCOUNTER — OFFICE VISIT (OUTPATIENT)
Dept: BARIATRICS | Facility: CLINIC | Age: 46
End: 2021-01-27

## 2021-01-27 ENCOUNTER — LAB VISIT (OUTPATIENT)
Dept: LAB | Facility: HOSPITAL | Age: 46
End: 2021-01-27
Attending: INTERNAL MEDICINE

## 2021-01-27 VITALS
SYSTOLIC BLOOD PRESSURE: 140 MMHG | WEIGHT: 315 LBS | HEART RATE: 68 BPM | DIASTOLIC BLOOD PRESSURE: 69 MMHG | BODY MASS INDEX: 41.75 KG/M2 | HEIGHT: 73 IN

## 2021-01-27 DIAGNOSIS — Z98.84 S/P LAPAROSCOPIC SLEEVE GASTRECTOMY: Primary | ICD-10-CM

## 2021-01-27 DIAGNOSIS — Z98.890 POST-OPERATIVE STATE: ICD-10-CM

## 2021-01-27 DIAGNOSIS — E66.01 MORBID OBESITY: ICD-10-CM

## 2021-01-27 LAB
25(OH)D3+25(OH)D2 SERPL-MCNC: 41 NG/ML (ref 30–96)
ALBUMIN SERPL BCP-MCNC: 3.8 G/DL (ref 3.5–5.2)
ALP SERPL-CCNC: 53 U/L (ref 55–135)
ALT SERPL W/O P-5'-P-CCNC: 37 U/L (ref 10–44)
ANION GAP SERPL CALC-SCNC: 11 MMOL/L (ref 8–16)
AST SERPL-CCNC: 31 U/L (ref 10–40)
BASOPHILS # BLD AUTO: 0.02 K/UL (ref 0–0.2)
BASOPHILS NFR BLD: 0.4 % (ref 0–1.9)
BILIRUB SERPL-MCNC: 0.6 MG/DL (ref 0.1–1)
BUN SERPL-MCNC: 12 MG/DL (ref 6–20)
CALCIUM SERPL-MCNC: 9.1 MG/DL (ref 8.7–10.5)
CHLORIDE SERPL-SCNC: 104 MMOL/L (ref 95–110)
CHOLEST SERPL-MCNC: 139 MG/DL (ref 120–199)
CHOLEST/HDLC SERPL: 4.6 {RATIO} (ref 2–5)
CO2 SERPL-SCNC: 26 MMOL/L (ref 23–29)
CREAT SERPL-MCNC: 0.9 MG/DL (ref 0.5–1.4)
DIFFERENTIAL METHOD: ABNORMAL
EOSINOPHIL # BLD AUTO: 0.1 K/UL (ref 0–0.5)
EOSINOPHIL NFR BLD: 2.5 % (ref 0–8)
ERYTHROCYTE [DISTWIDTH] IN BLOOD BY AUTOMATED COUNT: 14.7 % (ref 11.5–14.5)
EST. GFR  (AFRICAN AMERICAN): >60 ML/MIN/1.73 M^2
EST. GFR  (NON AFRICAN AMERICAN): >60 ML/MIN/1.73 M^2
GLUCOSE SERPL-MCNC: 95 MG/DL (ref 70–110)
HCT VFR BLD AUTO: 39.6 % (ref 40–54)
HDLC SERPL-MCNC: 30 MG/DL (ref 40–75)
HDLC SERPL: 21.6 % (ref 20–50)
HGB BLD-MCNC: 13.1 G/DL (ref 14–18)
IMM GRANULOCYTES # BLD AUTO: 0.02 K/UL (ref 0–0.04)
IMM GRANULOCYTES NFR BLD AUTO: 0.4 % (ref 0–0.5)
IRON SERPL-MCNC: 68 UG/DL (ref 45–160)
LDLC SERPL CALC-MCNC: 99 MG/DL (ref 63–159)
LYMPHOCYTES # BLD AUTO: 1.3 K/UL (ref 1–4.8)
LYMPHOCYTES NFR BLD: 25.1 % (ref 18–48)
MCH RBC QN AUTO: 29.4 PG (ref 27–31)
MCHC RBC AUTO-ENTMCNC: 33.1 G/DL (ref 32–36)
MCV RBC AUTO: 89 FL (ref 82–98)
MONOCYTES # BLD AUTO: 0.6 K/UL (ref 0.3–1)
MONOCYTES NFR BLD: 11.9 % (ref 4–15)
NEUTROPHILS # BLD AUTO: 3.1 K/UL (ref 1.8–7.7)
NEUTROPHILS NFR BLD: 59.7 % (ref 38–73)
NONHDLC SERPL-MCNC: 109 MG/DL
NRBC BLD-RTO: 0 /100 WBC
PLATELET # BLD AUTO: 259 K/UL (ref 150–350)
PMV BLD AUTO: 9.4 FL (ref 9.2–12.9)
POTASSIUM SERPL-SCNC: 3.8 MMOL/L (ref 3.5–5.1)
PROT SERPL-MCNC: 7.5 G/DL (ref 6–8.4)
RBC # BLD AUTO: 4.45 M/UL (ref 4.6–6.2)
SATURATED IRON: 21 % (ref 20–50)
SODIUM SERPL-SCNC: 141 MMOL/L (ref 136–145)
TOTAL IRON BINDING CAPACITY: 323 UG/DL (ref 250–450)
TRANSFERRIN SERPL-MCNC: 218 MG/DL (ref 200–375)
TRIGL SERPL-MCNC: 50 MG/DL (ref 30–150)
VIT B12 SERPL-MCNC: 1445 PG/ML (ref 210–950)
WBC # BLD AUTO: 5.22 K/UL (ref 3.9–12.7)

## 2021-01-27 PROCEDURE — 82607 VITAMIN B-12: CPT

## 2021-01-27 PROCEDURE — 82306 VITAMIN D 25 HYDROXY: CPT

## 2021-01-27 PROCEDURE — 36415 COLL VENOUS BLD VENIPUNCTURE: CPT

## 2021-01-27 PROCEDURE — 80053 COMPREHEN METABOLIC PANEL: CPT

## 2021-01-27 PROCEDURE — 84425 ASSAY OF VITAMIN B-1: CPT

## 2021-01-27 PROCEDURE — 99024 POSTOP FOLLOW-UP VISIT: CPT | Mod: ,,, | Performed by: PHYSICIAN ASSISTANT

## 2021-01-27 PROCEDURE — 99024 PR POST-OP FOLLOW-UP VISIT: ICD-10-PCS | Mod: ,,, | Performed by: PHYSICIAN ASSISTANT

## 2021-01-27 PROCEDURE — 99999 PR PBB SHADOW E&M-EST. PATIENT-LVL IV: ICD-10-PCS | Mod: PBBFAC,,, | Performed by: PHYSICIAN ASSISTANT

## 2021-01-27 PROCEDURE — 99999 PR PBB SHADOW E&M-EST. PATIENT-LVL IV: CPT | Mod: PBBFAC,,, | Performed by: PHYSICIAN ASSISTANT

## 2021-01-27 PROCEDURE — 85025 COMPLETE CBC W/AUTO DIFF WBC: CPT

## 2021-01-27 PROCEDURE — 99214 OFFICE O/P EST MOD 30 MIN: CPT | Mod: PBBFAC | Performed by: PHYSICIAN ASSISTANT

## 2021-01-27 PROCEDURE — 80061 LIPID PANEL: CPT

## 2021-01-27 PROCEDURE — 83540 ASSAY OF IRON: CPT

## 2021-01-27 RX ORDER — URSODIOL 300 MG/1
300 CAPSULE ORAL 2 TIMES DAILY
Qty: 60 CAPSULE | Refills: 5 | Status: SHIPPED | OUTPATIENT
Start: 2021-01-27 | End: 2021-01-27

## 2021-01-27 RX ORDER — CHOLECALCIFEROL (VITAMIN D3) 125 MCG
1 CAPSULE ORAL EVERY MORNING
COMMUNITY

## 2021-01-27 RX ORDER — VITAMIN E 268 MG
400 CAPSULE ORAL DAILY
COMMUNITY

## 2021-01-27 RX ORDER — URSODIOL 300 MG/1
300 CAPSULE ORAL 2 TIMES DAILY
Qty: 60 CAPSULE | Refills: 5 | Status: SHIPPED | OUTPATIENT
Start: 2021-01-27

## 2021-01-27 RX ORDER — MULTIVIT WITH MINERALS/HERBS
1 TABLET ORAL DAILY
COMMUNITY

## 2021-01-27 RX ORDER — ACETAMINOPHEN 500 MG
5000 TABLET ORAL DAILY
COMMUNITY

## 2021-01-27 RX ORDER — MULTIVITAMIN
1 TABLET ORAL DAILY
COMMUNITY

## 2021-01-29 LAB — VIT B1 BLD-MCNC: 54 UG/L (ref 38–122)

## 2021-10-04 ENCOUNTER — PATIENT MESSAGE (OUTPATIENT)
Dept: FAMILY MEDICINE | Facility: CLINIC | Age: 46
End: 2021-10-04

## 2022-03-07 ENCOUNTER — PATIENT MESSAGE (OUTPATIENT)
Dept: BARIATRICS | Facility: CLINIC | Age: 47
End: 2022-03-07

## 2022-04-07 ENCOUNTER — PATIENT MESSAGE (OUTPATIENT)
Dept: BARIATRICS | Facility: CLINIC | Age: 47
End: 2022-04-07

## 2022-04-12 ENCOUNTER — PATIENT MESSAGE (OUTPATIENT)
Dept: BARIATRICS | Facility: CLINIC | Age: 47
End: 2022-04-12

## 2022-05-05 ENCOUNTER — PATIENT MESSAGE (OUTPATIENT)
Dept: BARIATRICS | Facility: CLINIC | Age: 47
End: 2022-05-05

## 2022-05-10 ENCOUNTER — TELEPHONE (OUTPATIENT)
Dept: BARIATRICS | Facility: CLINIC | Age: 47
End: 2022-05-10

## 2022-05-10 ENCOUNTER — PATIENT MESSAGE (OUTPATIENT)
Dept: BARIATRICS | Facility: CLINIC | Age: 47
End: 2022-05-10

## 2022-06-10 ENCOUNTER — PATIENT MESSAGE (OUTPATIENT)
Dept: BARIATRICS | Facility: CLINIC | Age: 47
End: 2022-06-10

## 2022-06-14 ENCOUNTER — PATIENT MESSAGE (OUTPATIENT)
Dept: BARIATRICS | Facility: CLINIC | Age: 47
End: 2022-06-14

## 2022-07-05 ENCOUNTER — PATIENT MESSAGE (OUTPATIENT)
Dept: BARIATRICS | Facility: CLINIC | Age: 47
End: 2022-07-05

## 2022-08-02 ENCOUNTER — PATIENT MESSAGE (OUTPATIENT)
Dept: BARIATRICS | Facility: CLINIC | Age: 47
End: 2022-08-02

## 2022-08-04 ENCOUNTER — PATIENT MESSAGE (OUTPATIENT)
Dept: BARIATRICS | Facility: CLINIC | Age: 47
End: 2022-08-04

## 2022-09-07 ENCOUNTER — PATIENT MESSAGE (OUTPATIENT)
Dept: BARIATRICS | Facility: CLINIC | Age: 47
End: 2022-09-07

## 2022-10-06 ENCOUNTER — PATIENT MESSAGE (OUTPATIENT)
Dept: BARIATRICS | Facility: CLINIC | Age: 47
End: 2022-10-06

## 2022-11-04 ENCOUNTER — PATIENT MESSAGE (OUTPATIENT)
Dept: BARIATRICS | Facility: CLINIC | Age: 47
End: 2022-11-04

## 2022-12-07 ENCOUNTER — PATIENT MESSAGE (OUTPATIENT)
Dept: BARIATRICS | Facility: CLINIC | Age: 47
End: 2022-12-07

## 2023-01-09 ENCOUNTER — PATIENT MESSAGE (OUTPATIENT)
Dept: BARIATRICS | Facility: CLINIC | Age: 48
End: 2023-01-09

## 2023-05-31 ENCOUNTER — TELEPHONE (OUTPATIENT)
Dept: BARIATRICS | Facility: CLINIC | Age: 48
End: 2023-05-31

## 2023-06-13 ENCOUNTER — PATIENT MESSAGE (OUTPATIENT)
Dept: BARIATRICS | Facility: CLINIC | Age: 48
End: 2023-06-13

## 2025-02-12 ENCOUNTER — HOSPITAL ENCOUNTER (EMERGENCY)
Facility: OTHER | Age: 50
Discharge: HOME | End: 2025-02-12
Attending: EMERGENCY MEDICINE

## 2025-02-12 VITALS
WEIGHT: 315 LBS | DIASTOLIC BLOOD PRESSURE: 90 MMHG | HEIGHT: 73 IN | SYSTOLIC BLOOD PRESSURE: 160 MMHG | TEMPERATURE: 98 F | BODY MASS INDEX: 41.75 KG/M2 | HEART RATE: 63 BPM | RESPIRATION RATE: 20 BRPM | OXYGEN SATURATION: 95 %

## 2025-02-12 DIAGNOSIS — S68.624A PARTIAL TRAUMATIC AMPUTATION OF RIGHT RING FINGER THROUGH PHALANX, INITIAL ENCOUNTER: ICD-10-CM

## 2025-02-12 DIAGNOSIS — S62.664B OPEN NONDISPLACED FRACTURE OF DISTAL PHALANX OF RIGHT RING FINGER, INITIAL ENCOUNTER: Primary | ICD-10-CM

## 2025-02-12 LAB
ALBUMIN SERPL BCP-MCNC: 4.1 G/DL (ref 3.5–5.2)
ALP SERPL-CCNC: 56 U/L (ref 40–150)
ALT SERPL W/O P-5'-P-CCNC: 26 U/L (ref 10–44)
ANION GAP SERPL CALC-SCNC: 13 MMOL/L (ref 8–16)
AST SERPL-CCNC: 28 U/L (ref 10–40)
BASOPHILS # BLD AUTO: 0.03 K/UL (ref 0–0.2)
BASOPHILS NFR BLD: 0.6 % (ref 0–1.9)
BILIRUB SERPL-MCNC: 0.4 MG/DL (ref 0.1–1)
BUN SERPL-MCNC: 16 MG/DL (ref 6–20)
CALCIUM SERPL-MCNC: 9.7 MG/DL (ref 8.7–10.5)
CHLORIDE SERPL-SCNC: 104 MMOL/L (ref 95–110)
CO2 SERPL-SCNC: 21 MMOL/L (ref 23–29)
CREAT SERPL-MCNC: 0.8 MG/DL (ref 0.5–1.4)
DIFFERENTIAL METHOD BLD: ABNORMAL
EOSINOPHIL # BLD AUTO: 0.2 K/UL (ref 0–0.5)
EOSINOPHIL NFR BLD: 3 % (ref 0–8)
ERYTHROCYTE [DISTWIDTH] IN BLOOD BY AUTOMATED COUNT: 12.8 % (ref 11.5–14.5)
EST. GFR  (NO RACE VARIABLE): >60 ML/MIN/1.73 M^2
GLUCOSE SERPL-MCNC: 114 MG/DL (ref 70–110)
HCT VFR BLD AUTO: 41.9 % (ref 40–54)
HCV AB SERPL QL IA: NEGATIVE
HGB BLD-MCNC: 14.3 G/DL (ref 14–18)
HIV 1+2 AB+HIV1 P24 AG SERPL QL IA: NEGATIVE
IMM GRANULOCYTES # BLD AUTO: 0.02 K/UL (ref 0–0.04)
IMM GRANULOCYTES NFR BLD AUTO: 0.4 % (ref 0–0.5)
LYMPHOCYTES # BLD AUTO: 1.5 K/UL (ref 1–4.8)
LYMPHOCYTES NFR BLD: 27.4 % (ref 18–48)
MCH RBC QN AUTO: 30.4 PG (ref 27–31)
MCHC RBC AUTO-ENTMCNC: 34.1 G/DL (ref 32–36)
MCV RBC AUTO: 89 FL (ref 82–98)
MONOCYTES # BLD AUTO: 0.7 K/UL (ref 0.3–1)
MONOCYTES NFR BLD: 12.1 % (ref 4–15)
NEUTROPHILS # BLD AUTO: 3 K/UL (ref 1.8–7.7)
NEUTROPHILS NFR BLD: 56.5 % (ref 38–73)
NRBC BLD-RTO: 0 /100 WBC
PLATELET # BLD AUTO: 274 K/UL (ref 150–450)
PMV BLD AUTO: 9.1 FL (ref 9.2–12.9)
POTASSIUM SERPL-SCNC: 4.3 MMOL/L (ref 3.5–5.1)
PROT SERPL-MCNC: 8.4 G/DL (ref 6–8.4)
RBC # BLD AUTO: 4.71 M/UL (ref 4.6–6.2)
SODIUM SERPL-SCNC: 138 MMOL/L (ref 136–145)
WBC # BLD AUTO: 5.37 K/UL (ref 3.9–12.7)

## 2025-02-12 PROCEDURE — 90715 TDAP VACCINE 7 YRS/> IM: CPT

## 2025-02-12 PROCEDURE — 86803 HEPATITIS C AB TEST: CPT | Performed by: EMERGENCY MEDICINE

## 2025-02-12 PROCEDURE — 25000003 PHARM REV CODE 250

## 2025-02-12 PROCEDURE — 99284 EMERGENCY DEPT VISIT MOD MDM: CPT | Mod: 25

## 2025-02-12 PROCEDURE — 96374 THER/PROPH/DIAG INJ IV PUSH: CPT

## 2025-02-12 PROCEDURE — 85025 COMPLETE CBC W/AUTO DIFF WBC: CPT

## 2025-02-12 PROCEDURE — 90471 IMMUNIZATION ADMIN: CPT

## 2025-02-12 PROCEDURE — 63600175 PHARM REV CODE 636 W HCPCS

## 2025-02-12 PROCEDURE — 87389 HIV-1 AG W/HIV-1&-2 AB AG IA: CPT | Performed by: EMERGENCY MEDICINE

## 2025-02-12 PROCEDURE — 80053 COMPREHEN METABOLIC PANEL: CPT

## 2025-02-12 RX ORDER — IBUPROFEN 800 MG/1
800 TABLET ORAL EVERY 6 HOURS PRN
Qty: 20 TABLET | Refills: 0 | Status: SHIPPED | OUTPATIENT
Start: 2025-02-12

## 2025-02-12 RX ORDER — LIDOCAINE HYDROCHLORIDE 10 MG/ML
10 INJECTION, SOLUTION INFILTRATION; PERINEURAL
Status: COMPLETED | OUTPATIENT
Start: 2025-02-12 | End: 2025-02-12

## 2025-02-12 RX ORDER — OXYCODONE AND ACETAMINOPHEN 5; 325 MG/1; MG/1
1 TABLET ORAL EVERY 6 HOURS PRN
Qty: 12 TABLET | Refills: 0 | Status: SHIPPED | OUTPATIENT
Start: 2025-02-12 | End: 2025-02-15

## 2025-02-12 RX ORDER — CEPHALEXIN 500 MG/1
500 CAPSULE ORAL 2 TIMES DAILY
Qty: 14 CAPSULE | Refills: 0 | Status: SHIPPED | OUTPATIENT
Start: 2025-02-12 | End: 2025-02-19

## 2025-02-12 RX ORDER — OXYCODONE AND ACETAMINOPHEN 5; 325 MG/1; MG/1
1 TABLET ORAL
Status: COMPLETED | OUTPATIENT
Start: 2025-02-12 | End: 2025-02-12

## 2025-02-12 RX ORDER — CEFAZOLIN SODIUM 1 G/3ML
1 INJECTION, POWDER, FOR SOLUTION INTRAMUSCULAR; INTRAVENOUS
Status: COMPLETED | OUTPATIENT
Start: 2025-02-12 | End: 2025-02-12

## 2025-02-12 RX ADMIN — OXYCODONE HYDROCHLORIDE AND ACETAMINOPHEN 1 TABLET: 5; 325 TABLET ORAL at 11:02

## 2025-02-12 RX ADMIN — CEFAZOLIN 1 G: 330 INJECTION, POWDER, FOR SOLUTION INTRAMUSCULAR; INTRAVENOUS at 11:02

## 2025-02-12 RX ADMIN — CLOSTRIDIUM TETANI TOXOID ANTIGEN (FORMALDEHYDE INACTIVATED), CORYNEBACTERIUM DIPHTHERIAE TOXOID ANTIGEN (FORMALDEHYDE INACTIVATED), BORDETELLA PERTUSSIS TOXOID ANTIGEN (GLUTARALDEHYDE INACTIVATED), BORDETELLA PERTUSSIS FILAMENTOUS HEMAGGLUTININ ANTIGEN (FORMALDEHYDE INACTIVATED), BORDETELLA PERTUSSIS PERTACTIN ANTIGEN, AND BORDETELLA PERTUSSIS FIMBRIAE 2/3 ANTIGEN 0.5 ML: 5; 2; 2.5; 5; 3; 5 INJECTION, SUSPENSION INTRAMUSCULAR at 01:02

## 2025-02-12 RX ADMIN — LIDOCAINE HYDROCHLORIDE 10 ML: 10 INJECTION, SOLUTION INFILTRATION; PERINEURAL at 11:02

## 2025-02-12 NOTE — ED NOTES
Right 4th digit wound irrigated w/sterile NaCl, surgicel applied, covered / secured w/ sterile gauze.  Wound care instructions re-enforced = pt & spouse expressed verbal understanding.

## 2025-02-12 NOTE — ED PROVIDER NOTES
Encounter Date: 2/12/2025       History     Chief Complaint   Patient presents with    Finger Injury     Top part of finger smashed/cut off. Bleeding controlled. Pt unsure UTD w tetanus      Roshan Zaidi III is a 50 y.o. male, L hand dominant, presenting to the emergency department for evaluation of laceration and injury to right distal, fourth finger after he accidentally cut his finger on a chain this morning. He denies use of blood thinners. He denies numbness or tingling. He states that he is able to move the finger. He is unsure if his Tetanus shot is up to date. No other complaints at this time.       The history is provided by the patient.     Review of patient's allergies indicates:   Allergen Reactions    Shrimp Hives and Shortness Of Breath     History reviewed. No pertinent past medical history.  Past Surgical History:   Procedure Laterality Date    EYE SURGERY      LAPAROSCOPIC SLEEVE GASTRECTOMY N/A 12/2/2020    Procedure: GASTRECTOMY, SLEEVE, LAPAROSCOPIC, with intraop EGD;  Surgeon: Papa Arrington MD;  Location: [facility] OR [address] [place] [address];  Service: General;  Laterality: N/A;    TIBIA FRACTURE SURGERY Bilateral      Family History   Problem Relation Name Age of Onset    Diabetes Mother      No Known Problems Father      No Known Problems Son      No Known Problems Brother      No Known Problems Brother       Social History     Tobacco Use    Smoking status: Never    Smokeless tobacco: Never   Substance Use Topics    Alcohol use: No    Drug use: No     Review of Systems  As per HPI.   Physical Exam     Initial Vitals   BP Pulse Resp Temp SpO2   02/12/25 1143 02/12/25 1143 02/12/25 1108 02/12/25 1143 02/12/25 1143   (!) 160/90 63 20 98.1 °F (36.7 °C) 95 %      MAP       --                Physical Exam    Vitals reviewed.  Constitutional: He appears well-developed and well-nourished. No distress.   HENT:   Head: Normocephalic and atraumatic.   Nose: Nose normal. Mouth/Throat: Oropharynx is clear  and moist.   Eyes: Conjunctivae and EOM are normal. Pupils are equal, round, and reactive to light.   Neck: Neck supple.   Normal range of motion.  Cardiovascular:            Pulses:       Radial pulses are 2+ on the right side.   Musculoskeletal:         General: Normal range of motion.      Cervical back: Normal range of motion and neck supple.      Comments: Able to make a fist with the right hand. Normal ROM of right fourth finger with isolated testing.  Partial amputation to right 4th DIP.  Appears to be an open fracture.      Neurological: He is alert and oriented to person, place, and time. He has normal strength. GCS score is 15. GCS eye subscore is 4. GCS verbal subscore is 5. GCS motor subscore is 6.   Right upper extremity is neurovascularly intact.    Skin: Skin is warm and dry. Capillary refill takes less than 2 seconds.   Psychiatric: He has a normal mood and affect. His behavior is normal. Judgment and thought content normal.         ED Course   Procedures  Labs Reviewed   CBC W/ AUTO DIFFERENTIAL - Abnormal       Result Value    WBC 5.37      RBC 4.71      Hemoglobin 14.3      Hematocrit 41.9      MCV 89      MCH 30.4      MCHC 34.1      RDW 12.8      Platelets 274      MPV 9.1 (*)     Immature Granulocytes 0.4      Gran # (ANC) 3.0      Immature Grans (Abs) 0.02      Lymph # 1.5      Mono # 0.7      Eos # 0.2      Baso # 0.03      nRBC 0      Gran % 56.5      Lymph % 27.4      Mono % 12.1      Eosinophil % 3.0      Basophil % 0.6      Differential Method Automated      Narrative:     Release to patient->Immediate   COMPREHENSIVE METABOLIC PANEL - Abnormal    Sodium 138      Potassium 4.3      Chloride 104      CO2 21 (*)     Glucose 114 (*)     BUN 16      Creatinine 0.8      Calcium 9.7      Total Protein 8.4      Albumin 4.1      Total Bilirubin 0.4      Alkaline Phosphatase 56      AST 28      ALT 26      eGFR >60      Anion Gap 13      Narrative:     Release to patient->Immediate   HEPATITIS C  ANTIBODY    Hepatitis C Ab Negative      Narrative:     Release to patient->Immediate   HIV 1 / 2 ANTIBODY    HIV 1/2 Ag/Ab Negative      Narrative:     Release to patient->Immediate          Imaging Results              X-Ray Finger 2 or More Views Right (Final result)  Result time 02/12/25 12:34:58      Final result by Xi Pierre MD (02/12/25 12:34:58)                   Impression:      Please see above.      Electronically signed by: Xi Pierre  Date:    02/12/2025  Time:    12:34               Narrative:    EXAMINATION:  XR FINGER 2 OR MORE VIEWS RIGHT    CLINICAL HISTORY:  right finger injury;    TECHNIQUE:  Three views 4th digit right hand.    COMPARISON:  None    FINDINGS:  Open fracture with traumatic amputation of the distal phalanx and adjacent soft tissues.Adjacent fracture fragments in the soft tissues.    No radiopaque retained foreign body.                                       Medications   ceFAZolin injection 1 g (1 g Intravenous Given 2/12/25 1136)   oxyCODONE-acetaminophen 5-325 mg per tablet 1 tablet (1 tablet Oral Given 2/12/25 1130)   LIDOcaine HCL 10 mg/ml (1%) injection 10 mL (10 mLs Infiltration Given 2/12/25 1131)   Tdap vaccine injection 0.5 mL (0.5 mLs Intramuscular Given 2/12/25 1359)     Medical Decision Making  Amount and/or Complexity of Data Reviewed  Labs: ordered.  Radiology: ordered.    Risk  Prescription drug management.                          Medical Decision Making:   Initial Assessment:   Urgent evaluation of healthy 50-year-old male, left-hand dominant, presenting with partial amputation to the right 4th distal phalanx.  States that he accidentally cut his finger with a chain while working today.  She denies use of blood thinners.  Unsure if his tetanus shot is up-to-date.  He denies numbness or tingling.  He has been able to move the rest of the finger.  On exam, he is uncomfortable appearing but nontoxic.  Hemodynamically stable.  Afebrile in the ED. Able to  make a fist with the right hand. Normal ROM of right fourth finger with isolated testing.  Partial amputation to right 4th DIP.  Appears to be an open fracture.  Right upper extremity is neurovascularly intact.  Plan for x-rays.  Will give him a dose of Ancef IV and analgesics.  Differential Diagnosis:   Differential Diagnosis includes, but is not limited to:  Open fracture, vascular injury, tendon/ligament injury, nerve injury, retained foreign body, superficial laceration, abrasion.    Clinical Tests:   Radiological Study: Ordered and Reviewed  ED Management:  On review of x-ray, there is an open fracture with traumatic amputation of the distal phalanx and adjacent soft tissues.Adjacent fracture fragments in the soft tissues. Case was discussed with Dr. Dickinson who recommends speaking to Ortho. Case was discussed with Dr. Horta (orthopedics) who recommends washout, pressure dressing, antibiotics and follow up in ortho clinic.  I updated the patient and his wife on all results.  His wound was thoroughly irrigated by the nurse.  Surgicel and gauze were applied over the open fracture.  He was given proper wound care instructions.  Follow-up instructions were given.  Advised him to take Tylenol or ibuprofen as needed for mild-to-moderate pain.  Discharged with a course of Keflex for infection prophylaxis and few tablets of Percocet 5.  Patient verbalized understanding and agreement with this plan of care. He was given specific return precautions. Advised to follow up with PCP as needed. All questions and concerns addressed. He is stable for discharge.     This note was created with MModal Fluency Direct Dictation. Please excuse any spelling or grammatical errors.             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 Provider    ibuprofen (ADVIL,MOTRIN) 800 MG tablet Take 1 tablet (800 mg total) by mouth every 6 (six) hours as needed for Pain. 20 tablet 2/12/2025 -- Ko Gallardo PA-C    cephALEXin (KEFLEX) 500 MG capsule Take 1 capsule (500 mg total) by mouth 2 (two) times a day. for 7 days 14 capsule 2/12/2025 2/19/2025 Ko Gallardo PA-C    oxyCODONE-acetaminophen (PERCOCET) 5-325 mg per tablet Take 1 tablet by mouth every 6 (six) hours as needed for Pain. 12 tablet 2/12/2025 2/15/2025 Ko Gallardo PA-C          Follow-up Information       Follow up With Specialties Details Why Contact Info    Zelalem Horta MD Orthopedic Surgery Schedule an appointment as soon as possible for a visit today  [address] [place] [id number]  [phone]               Ko Gallardo PA-C  02/13/25 1024

## 2025-02-12 NOTE — ED TRIAGE NOTES
Patient presents to ED after finger injured while a co-worker was changing pipes and binder came down and caught right ring finger, part of tip is missing, bleeding controlled at this time, awake and alert.

## 2025-02-13 ENCOUNTER — TELEPHONE (OUTPATIENT)
Dept: ORTHOPEDICS | Facility: CLINIC | Age: 50
End: 2025-02-13

## 2025-02-13 NOTE — TELEPHONE ENCOUNTER
Called pt to verify insurance information. Pt stated he was not home at the moment to be able to verify when his insurance would be active, and asked me to send a message in the portal with my question. I confirmed I would message him in the portal.    Isela Arriaga MA  [facility]  P: [phone]  F: [phone]

## 2025-02-17 ENCOUNTER — TELEPHONE (OUTPATIENT)
Dept: ORTHOPEDICS | Facility: CLINIC | Age: 50
End: 2025-02-17

## 2025-02-17 NOTE — TELEPHONE ENCOUNTER
LVM  Called pt to verify insurance information; asked pt to provide info so that xray could be scheduled for his upcoming appointment on Wednesday. Notified pt that if his coverage information was not on file, his appointment would unfortunately have to be cancelled until he is able to provide insurance information.    Isela Arriaga MA  [facility]  P: [phone]  F: [phone]

## 2025-02-18 ENCOUNTER — TELEPHONE (OUTPATIENT)
Dept: ORTHOPEDICS | Facility: CLINIC | Age: 50
End: 2025-02-18

## 2025-02-18 NOTE — TELEPHONE ENCOUNTER
Called pt to verify insurance information; asked pt to provide info so that xray could be scheduled for his upcoming appointment on Wednesday. Notified pt that if his coverage information was not on file, his appointment would unfortunately have to be cancelled until he is able to provide insurance information.    Pt stated he is not sure he has coverage and is not home to verify. I explained that if the pt brings an insurance card in, we can add his insurance to his file; however, if he does not have insurance we are not able to see him at this time unfortunately. I mentioned that I could provide the pt with another clinic's contact number to schedule which takes self pay patients and he asked for details. Recommended pt reach out to the [facility] and provided him with the phone number: [phone].      Cancelled appointment scheduled for 2/19 with MV PA-C Caroline Barbier, MA [facility]  P: [phone]  F: [phone]

## (undated) DEVICE — ELECTRODE REM PLYHSV RETURN 9

## (undated) DEVICE — TROCAR ENDOPATH XCEL 12X100MM

## (undated) DEVICE — SUT STRATAFIX PDO 2-0 SH

## (undated) DEVICE — CANNULA ENDOPATH XCEL 5X100MM

## (undated) DEVICE — TUBING HF INSUFFLATION W/ FLTR

## (undated) DEVICE — STAPLER ECHELON FLEX 60MM 44CM

## (undated) DEVICE — SHEARS HARMONIC 5CM 36CM

## (undated) DEVICE — SUT GUT PL. 4-0 27 FS-2

## (undated) DEVICE — BLADE SURG CARBON STEEL SZ11

## (undated) DEVICE — SUT 0 VICRYL / UR6 (J603)

## (undated) DEVICE — RELOAD ECHELON FLEX BLU 60MM

## (undated) DEVICE — TROCAR ENDOPATH XCEL 5X100MM

## (undated) DEVICE — HEMOSTAT SURGICEL PWD 3G

## (undated) DEVICE — RELOAD ECHELON FLEX GRN 60MM

## (undated) DEVICE — ADHESIVE DERMABOND ADVANCED

## (undated) DEVICE — POWDER ARISTA AH 3G

## (undated) DEVICE — TROCAR ENDOPATH XCEL 12MM 10CM

## (undated) DEVICE — Device

## (undated) DEVICE — NDL HYPO REG 25G X 1 1/2

## (undated) DEVICE — WARMER DRAPE STERILE LF

## (undated) DEVICE — LUBRICANT SURGILUBE 2 OZ

## (undated) DEVICE — APPLICATOR ARISTA FLEX XL